# Patient Record
Sex: FEMALE | Race: WHITE | NOT HISPANIC OR LATINO | URBAN - METROPOLITAN AREA
[De-identification: names, ages, dates, MRNs, and addresses within clinical notes are randomized per-mention and may not be internally consistent; named-entity substitution may affect disease eponyms.]

---

## 2019-01-17 ENCOUNTER — OFFICE VISIT CONVERTED (OUTPATIENT)
Dept: FAMILY MEDICINE CLINIC | Facility: CLINIC | Age: 29
End: 2019-01-17
Attending: NURSE PRACTITIONER

## 2019-01-30 ENCOUNTER — HOSPITAL ENCOUNTER (OUTPATIENT)
Dept: FAMILY MEDICINE CLINIC | Facility: CLINIC | Age: 29
Discharge: HOME | End: 2019-01-30
Attending: NURSE PRACTITIONER

## 2019-01-30 ENCOUNTER — OFFICE VISIT CONVERTED (OUTPATIENT)
Dept: FAMILY MEDICINE CLINIC | Facility: CLINIC | Age: 29
End: 2019-01-30
Attending: NURSE PRACTITIONER

## 2019-02-01 LAB — BACTERIA SPEC AEROBE CULT: NORMAL

## 2019-02-06 ENCOUNTER — OFFICE VISIT CONVERTED (OUTPATIENT)
Dept: FAMILY MEDICINE CLINIC | Facility: CLINIC | Age: 29
End: 2019-02-06
Attending: NURSE PRACTITIONER

## 2019-02-06 ENCOUNTER — HOSPITAL ENCOUNTER (OUTPATIENT)
Dept: FAMILY MEDICINE CLINIC | Facility: CLINIC | Age: 29
Discharge: HOME | End: 2019-02-06
Attending: NURSE PRACTITIONER

## 2019-02-06 LAB
ALBUMIN SERPL-MCNC: 4.1 G/DL (ref 3.5–5)
ALBUMIN/GLOB SERPL: 1.2 {RATIO} (ref 1.4–2.6)
ALP SERPL-CCNC: 83 U/L (ref 42–98)
ALT SERPL-CCNC: 17 U/L (ref 10–40)
ANION GAP SERPL CALC-SCNC: 17 MMOL/L (ref 8–19)
AST SERPL-CCNC: 17 U/L (ref 15–50)
BASOPHILS # BLD AUTO: 0 10*3/UL (ref 0–0.2)
BASOPHILS NFR BLD AUTO: 0 % (ref 0–3)
BILIRUB SERPL-MCNC: <0.15 MG/DL (ref 0.2–1.3)
BUN SERPL-MCNC: 14 MG/DL (ref 5–25)
BUN/CREAT SERPL: 21 {RATIO} (ref 6–20)
CALCIUM SERPL-MCNC: 9.1 MG/DL (ref 8.7–10.4)
CHLORIDE SERPL-SCNC: 105 MMOL/L (ref 99–111)
CONV CO2: 26 MMOL/L (ref 22–32)
CONV TOTAL PROTEIN: 7.6 G/DL (ref 6.3–8.2)
CREAT UR-MCNC: 0.67 MG/DL (ref 0.5–0.9)
EOSINOPHIL # BLD AUTO: 0.22 10*3/UL (ref 0–0.7)
EOSINOPHIL # BLD AUTO: 4.19 % (ref 0–7)
ERYTHROCYTE [DISTWIDTH] IN BLOOD BY AUTOMATED COUNT: 12.3 % (ref 11.5–14.5)
GFR SERPLBLD BASED ON 1.73 SQ M-ARVRAT: >60 ML/MIN/{1.73_M2}
GLOBULIN UR ELPH-MCNC: 3.5 G/DL (ref 2–3.5)
GLUCOSE SERPL-MCNC: 73 MG/DL (ref 65–99)
HBA1C MFR BLD: 11.7 G/DL (ref 12–16)
HCT VFR BLD AUTO: 34.7 % (ref 37–47)
LYMPHOCYTES # BLD AUTO: 2.34 10*3/UL (ref 1–5)
MCH RBC QN AUTO: 29.2 PG (ref 27–31)
MCHC RBC AUTO-ENTMCNC: 33.6 G/DL (ref 33–37)
MCV RBC AUTO: 86.8 FL (ref 81–99)
MONOCYTES # BLD AUTO: 0.44 10*3/UL (ref 0.2–1.2)
MONOCYTES NFR BLD AUTO: 8.45 % (ref 3–10)
NEUTROPHILS # BLD AUTO: 2.2 10*3/UL (ref 2–8)
NEUTROPHILS NFR BLD AUTO: 42.4 % (ref 30–85)
NRBC BLD AUTO-RTO: 0 % (ref 0–0.01)
OSMOLALITY SERPL CALC.SUM OF ELEC: 297 MOSM/KG (ref 273–304)
PLATELET # BLD AUTO: 235 10*3/UL (ref 130–400)
PMV BLD AUTO: 7.5 FL (ref 7.4–10.4)
POTASSIUM SERPL-SCNC: 4 MMOL/L (ref 3.5–5.3)
RBC # BLD AUTO: 4 10*6/UL (ref 4.2–5.4)
SODIUM SERPL-SCNC: 144 MMOL/L (ref 135–147)
T4 FREE SERPL-MCNC: 1.3 NG/DL (ref 0.9–1.8)
TSH SERPL-ACNC: 2 M[IU]/L (ref 0.27–4.2)
VARIANT LYMPHS NFR BLD MANUAL: 45 % (ref 20–45)
WBC # BLD AUTO: 5.2 10*3/UL (ref 4.8–10.8)

## 2019-02-07 LAB
CONV ANTI MICROSOMAL AB: 18 IU/ML (ref 0–34)
THYROGLOBULIN ANTIBODY: <1 IU/ML (ref 0–0.9)

## 2019-02-08 ENCOUNTER — HOSPITAL ENCOUNTER (OUTPATIENT)
Dept: OTHER | Facility: HOSPITAL | Age: 29
Discharge: HOME | End: 2019-02-08
Attending: NURSE PRACTITIONER

## 2019-03-19 ENCOUNTER — CONVERSION ENCOUNTER (OUTPATIENT)
Dept: FAMILY MEDICINE CLINIC | Facility: CLINIC | Age: 29
End: 2019-03-19

## 2019-03-19 ENCOUNTER — OFFICE VISIT CONVERTED (OUTPATIENT)
Dept: FAMILY MEDICINE CLINIC | Facility: CLINIC | Age: 29
End: 2019-03-19
Attending: NURSE PRACTITIONER

## 2019-04-16 ENCOUNTER — HOSPITAL ENCOUNTER (OUTPATIENT)
Dept: FAMILY MEDICINE CLINIC | Facility: CLINIC | Age: 29
Discharge: HOME | End: 2019-04-16
Attending: NURSE PRACTITIONER

## 2019-04-16 ENCOUNTER — OFFICE VISIT CONVERTED (OUTPATIENT)
Dept: FAMILY MEDICINE CLINIC | Facility: CLINIC | Age: 29
End: 2019-04-16
Attending: NURSE PRACTITIONER

## 2019-04-16 LAB
BASOPHILS # BLD AUTO: 0.05 10*3/UL (ref 0–0.2)
BASOPHILS NFR BLD AUTO: 0.5 % (ref 0–3)
CONV ABS IMM GRAN: 0.03 10*3/UL (ref 0–0.2)
CONV IMMATURE GRAN: 0.3 % (ref 0–1.8)
DEPRECATED RDW RBC AUTO: 39.6 FL (ref 36.4–46.3)
EOSINOPHIL # BLD AUTO: 0.26 10*3/UL (ref 0–0.7)
EOSINOPHIL # BLD AUTO: 2.7 % (ref 0–7)
ERYTHROCYTE [DISTWIDTH] IN BLOOD BY AUTOMATED COUNT: 12.4 % (ref 11.7–14.4)
FERRITIN SERPL-MCNC: 23 NG/ML (ref 10–200)
FOLATE SERPL-MCNC: 15.4 NG/ML (ref 4.8–20)
HBA1C MFR BLD: 12.4 G/DL (ref 12–16)
HCT VFR BLD AUTO: 38.8 % (ref 37–47)
IRON SATN MFR SERPL: 12 % (ref 20–55)
IRON SERPL-MCNC: 44 UG/DL (ref 60–170)
LYMPHOCYTES # BLD AUTO: 2.59 10*3/UL (ref 1–5)
MCH RBC QN AUTO: 28.1 PG (ref 27–31)
MCHC RBC AUTO-ENTMCNC: 32 G/DL (ref 33–37)
MCV RBC AUTO: 88 FL (ref 81–99)
MONOCYTES # BLD AUTO: 0.46 10*3/UL (ref 0.2–1.2)
MONOCYTES NFR BLD AUTO: 4.8 % (ref 3–10)
NEUTROPHILS # BLD AUTO: 6.12 10*3/UL (ref 2–8)
NEUTROPHILS NFR BLD AUTO: 64.5 % (ref 30–85)
NRBC CBCN: 0 % (ref 0–0.7)
PLATELET # BLD AUTO: 311 10*3/UL (ref 130–400)
PMV BLD AUTO: 10.9 FL (ref 9.4–12.3)
RBC # BLD AUTO: 4.41 10*6/UL (ref 4.2–5.4)
TIBC SERPL-MCNC: 355 UG/DL (ref 245–450)
TRANSFERRIN SERPL-MCNC: 248 MG/DL (ref 250–380)
VARIANT LYMPHS NFR BLD MANUAL: 27.2 % (ref 20–45)
VIT B12 SERPL-MCNC: 471 PG/ML (ref 211–911)
WBC # BLD AUTO: 9.51 10*3/UL (ref 4.8–10.8)

## 2019-09-13 ENCOUNTER — OFFICE VISIT CONVERTED (OUTPATIENT)
Dept: FAMILY MEDICINE CLINIC | Facility: CLINIC | Age: 29
End: 2019-09-13
Attending: NURSE PRACTITIONER

## 2019-09-25 ENCOUNTER — HOSPITAL ENCOUNTER (OUTPATIENT)
Dept: OTHER | Facility: HOSPITAL | Age: 29
Discharge: HOME | End: 2019-09-25
Attending: NURSE PRACTITIONER

## 2019-10-27 ENCOUNTER — HOSPITAL ENCOUNTER (OUTPATIENT)
Dept: URGENT CARE | Facility: CLINIC | Age: 29
Discharge: HOME | End: 2019-10-27

## 2019-10-29 LAB — BACTERIA SPEC AEROBE CULT: NORMAL

## 2019-11-05 ENCOUNTER — OFFICE VISIT CONVERTED (OUTPATIENT)
Dept: FAMILY MEDICINE CLINIC | Facility: CLINIC | Age: 29
End: 2019-11-05
Attending: NURSE PRACTITIONER

## 2019-12-26 ENCOUNTER — OFFICE VISIT CONVERTED (OUTPATIENT)
Dept: FAMILY MEDICINE CLINIC | Facility: CLINIC | Age: 29
End: 2019-12-26
Attending: NURSE PRACTITIONER

## 2020-02-05 ENCOUNTER — HOSPITAL ENCOUNTER (OUTPATIENT)
Dept: URGENT CARE | Facility: CLINIC | Age: 30
Discharge: HOME | End: 2020-02-05

## 2020-02-07 LAB — BACTERIA SPEC AEROBE CULT: NORMAL

## 2021-05-09 VITALS
DIASTOLIC BLOOD PRESSURE: 80 MMHG | TEMPERATURE: 97.7 F | SYSTOLIC BLOOD PRESSURE: 120 MMHG | WEIGHT: 155.5 LBS | OXYGEN SATURATION: 98 % | HEIGHT: 67 IN | HEART RATE: 122 BPM | BODY MASS INDEX: 24.4 KG/M2

## 2021-05-09 VITALS
BODY MASS INDEX: 23.62 KG/M2 | SYSTOLIC BLOOD PRESSURE: 106 MMHG | HEART RATE: 107 BPM | WEIGHT: 150.5 LBS | HEIGHT: 67 IN | OXYGEN SATURATION: 97 % | DIASTOLIC BLOOD PRESSURE: 68 MMHG | TEMPERATURE: 98.2 F

## 2021-05-09 VITALS
HEART RATE: 88 BPM | SYSTOLIC BLOOD PRESSURE: 108 MMHG | TEMPERATURE: 97.4 F | BODY MASS INDEX: 23.56 KG/M2 | OXYGEN SATURATION: 99 % | HEIGHT: 67 IN | WEIGHT: 150.12 LBS | DIASTOLIC BLOOD PRESSURE: 58 MMHG

## 2021-05-09 VITALS
TEMPERATURE: 97.2 F | WEIGHT: 154.19 LBS | HEART RATE: 95 BPM | SYSTOLIC BLOOD PRESSURE: 116 MMHG | OXYGEN SATURATION: 97 % | DIASTOLIC BLOOD PRESSURE: 78 MMHG

## 2021-05-09 VITALS
WEIGHT: 152 LBS | TEMPERATURE: 97.8 F | HEART RATE: 77 BPM | DIASTOLIC BLOOD PRESSURE: 80 MMHG | SYSTOLIC BLOOD PRESSURE: 120 MMHG | OXYGEN SATURATION: 100 %

## 2021-05-09 VITALS
OXYGEN SATURATION: 98 % | HEART RATE: 85 BPM | TEMPERATURE: 98.6 F | BODY MASS INDEX: 24.33 KG/M2 | WEIGHT: 155 LBS | DIASTOLIC BLOOD PRESSURE: 74 MMHG | SYSTOLIC BLOOD PRESSURE: 116 MMHG | HEIGHT: 67 IN

## 2021-05-09 VITALS
HEART RATE: 103 BPM | DIASTOLIC BLOOD PRESSURE: 80 MMHG | BODY MASS INDEX: 24.37 KG/M2 | OXYGEN SATURATION: 97 % | WEIGHT: 155.25 LBS | HEIGHT: 67 IN | TEMPERATURE: 97.6 F | SYSTOLIC BLOOD PRESSURE: 122 MMHG

## 2021-05-09 VITALS
WEIGHT: 152 LBS | HEART RATE: 110 BPM | DIASTOLIC BLOOD PRESSURE: 84 MMHG | OXYGEN SATURATION: 97 % | BODY MASS INDEX: 23.86 KG/M2 | SYSTOLIC BLOOD PRESSURE: 138 MMHG | TEMPERATURE: 97.8 F | HEIGHT: 67 IN

## 2023-02-13 ENCOUNTER — OFFICE VISIT (OUTPATIENT)
Dept: FAMILY MEDICINE CLINIC | Facility: CLINIC | Age: 33
End: 2023-02-13
Payer: COMMERCIAL

## 2023-02-13 VITALS
SYSTOLIC BLOOD PRESSURE: 112 MMHG | HEART RATE: 88 BPM | OXYGEN SATURATION: 99 % | TEMPERATURE: 97.8 F | DIASTOLIC BLOOD PRESSURE: 72 MMHG | WEIGHT: 171 LBS | BODY MASS INDEX: 26.84 KG/M2 | HEIGHT: 67 IN

## 2023-02-13 DIAGNOSIS — E04.1 THYROID NODULE: Primary | ICD-10-CM

## 2023-02-13 DIAGNOSIS — R51.9 ACUTE NONINTRACTABLE HEADACHE, UNSPECIFIED HEADACHE TYPE: ICD-10-CM

## 2023-02-13 DIAGNOSIS — R53.83 OTHER FATIGUE: ICD-10-CM

## 2023-02-13 DIAGNOSIS — Z13.220 SCREENING CHOLESTEROL LEVEL: ICD-10-CM

## 2023-02-13 DIAGNOSIS — F41.9 ANXIETY: ICD-10-CM

## 2023-02-13 PROCEDURE — 80053 COMPREHEN METABOLIC PANEL: CPT | Performed by: NURSE PRACTITIONER

## 2023-02-13 PROCEDURE — 99204 OFFICE O/P NEW MOD 45 MIN: CPT | Performed by: NURSE PRACTITIONER

## 2023-02-13 PROCEDURE — 80061 LIPID PANEL: CPT | Performed by: NURSE PRACTITIONER

## 2023-02-13 PROCEDURE — 86800 THYROGLOBULIN ANTIBODY: CPT | Performed by: NURSE PRACTITIONER

## 2023-02-13 PROCEDURE — 82746 ASSAY OF FOLIC ACID SERUM: CPT | Performed by: NURSE PRACTITIONER

## 2023-02-13 PROCEDURE — 86376 MICROSOMAL ANTIBODY EACH: CPT | Performed by: NURSE PRACTITIONER

## 2023-02-13 PROCEDURE — 84443 ASSAY THYROID STIM HORMONE: CPT | Performed by: NURSE PRACTITIONER

## 2023-02-13 PROCEDURE — 82607 VITAMIN B-12: CPT | Performed by: NURSE PRACTITIONER

## 2023-02-13 RX ORDER — RIZATRIPTAN BENZOATE 5 MG/1
5 TABLET ORAL AS NEEDED
Qty: 12 TABLET | Refills: 0 | Status: SHIPPED | OUTPATIENT
Start: 2023-02-13

## 2023-02-13 RX ORDER — ESCITALOPRAM OXALATE 10 MG/1
10 TABLET ORAL DAILY
Qty: 30 TABLET | Refills: 1 | Status: SHIPPED | OUTPATIENT
Start: 2023-02-13 | End: 2023-03-13 | Stop reason: SDUPTHER

## 2023-02-13 NOTE — PROGRESS NOTES
Venipuncture Blood Specimen Collection  Venipuncture performed in left arm by Amy Valerio with good hemostasis. Patient tolerated the procedure well without complications.   02/13/23   Amy Valerio

## 2023-02-13 NOTE — PROGRESS NOTES
Chief Complaint  Thyroid Problem (Thyroid nodule, needs updated U/S), Headache (Having migraines, worse since having son that is 18 months), and Anxiety (Wants to start a medication for anxiety/depression)    PHQ-9 Total Score: 19    Subjective        Past Medical History:   Diagnosis Date   • Anxiety    • Benign essential hypertension    • Blood clotting disorder (HCC)    • Depression    • GERD (gastroesophageal reflux disease)    • Headache    • Seasonal allergies      Social History     Tobacco Use   • Smoking status: Former     Packs/day: 1.00     Years: 10.00     Pack years: 10.00     Types: Cigarettes   • Smokeless tobacco: Never   Substance Use Topics   • Alcohol use: Yes     Comment: Current some day    • Drug use: Never      No current outpatient medications on file prior to visit.     No current facility-administered medications on file prior to visit.      No Known Allergies   Health Maintenance Due   Topic Date Due   • COVID-19 Vaccine (1) Never done   • ANNUAL PHYSICAL  Never done   • PAP SMEAR  Never done   • INFLUENZA VACCINE  08/01/2022      Cherie Soriano presents to [facility]  History of Present Illness  Here with concerns about a thyroid nodule, needs an updated ultrasound. Pt states she was referred to Endocrinology but she has not been seen by Endo. Pt states her TSH was normal until about 1 year ago and was high; pt not on medication. Pt states she has very little motivation, difficulty losing weight, notes difficulty staying asleep.     Headaches: notes hx of headaches since a teenager and has been able to manage until her most recent pregnancy, 18 month old. Pt states she was on medication, but after delivery they improved. Recently getting headaches with dizziness, nauseated, feels out of body, sound sensitivity. Notes near syncope at times so will not drive with headache. She will take Excedrin migraine. Pt notes having migraines about 2-3 times monthly.  "    Anxiety: She had an intake appointment with therapist last week. She has been on medication in the past without relief of symptoms including Prozac, Zoloft only relieved nightmares, Celexa and felt very tired. Notes feeling overwhelmed and notes lots of noise. Will worry about irrational and unlikely situations. Pt notes she have thoughts without plan and         Objective   Vital Signs:   /72 (BP Location: Left arm)   Pulse 88   Temp 97.8 °F (36.6 °C)   Ht 170.2 cm (67\")   Wt 77.6 kg (171 lb)   SpO2 99%   BMI 26.78 kg/m²     Review of Systems   Physical Exam  Vitals reviewed.   Constitutional:       General: She is not in acute distress.     Appearance: Normal appearance. She is well-developed.   HENT:      Head: Normocephalic and atraumatic.      Right Ear: External ear normal.      Left Ear: External ear normal.   Eyes:      Conjunctiva/sclera: Conjunctivae normal.      Pupils: Pupils are equal, round, and reactive to light.   Cardiovascular:      Rate and Rhythm: Normal rate and regular rhythm.      Heart sounds: Normal heart sounds.   Pulmonary:      Effort: Pulmonary effort is normal.      Breath sounds: Normal breath sounds.   Musculoskeletal:      Cervical back: Neck supple. No tenderness.   Skin:     General: Skin is warm and dry.   Neurological:      Mental Status: She is alert and oriented to person, place, and time.   Psychiatric:         Mood and Affect: Mood and affect normal.         Behavior: Behavior normal.         Thought Content: Thought content normal.         Judgment: Judgment normal.        Result Review :                 Assessment and Plan    Diagnoses and all orders for this visit:    1. Thyroid nodule (Primary)  -     US Thyroid; Future  -     Ambulatory Referral to Endocrinology  -     TSH Rfx On Abnormal To Free T4  -     Thyroid Antibodies    2. Anxiety  -     escitalopram (Lexapro) 10 MG tablet; Take 1 tablet by mouth Daily. Take 1/2 tablet daily x 1 week then increase " to 1 tablet daily  Dispense: 30 tablet; Refill: 1    3. Acute nonintractable headache, unspecified headache type  -     rizatriptan (MAXALT) 5 MG tablet; Take 1 tablet by mouth As Needed for Migraine (at onset of headache) for up to 1 dose. May repeat in 2 hours if needed  Dispense: 12 tablet; Refill: 0    4. Screening cholesterol level  -     Lipid Panel    5. Other fatigue  -     Comprehensive Metabolic Panel  -     Vitamin B12 & Folate    Patient to follow-up in 2 to 4 weeks to see if medication is helping with symptoms.  Discussed appropriate use of Maxalt.  Patient states understanding.  Patient verbally agrees to no self-harm.  Patient to continue to see therapist.    Follow Up   Return in about 4 weeks (around 3/13/2023) for Recheck.  Patient was given instructions and counseling regarding her condition or for health maintenance advice. Please see specific information pulled into the AVS if appropriate.

## 2023-02-14 LAB
ALBUMIN SERPL-MCNC: 4.7 G/DL (ref 3.5–5.2)
ALBUMIN/GLOB SERPL: 1.5 G/DL
ALP SERPL-CCNC: 77 U/L (ref 39–117)
ALT SERPL W P-5'-P-CCNC: 12 U/L (ref 1–33)
ANION GAP SERPL CALCULATED.3IONS-SCNC: 12.3 MMOL/L (ref 5–15)
AST SERPL-CCNC: 17 U/L (ref 1–32)
BILIRUB SERPL-MCNC: 0.4 MG/DL (ref 0–1.2)
BUN SERPL-MCNC: 9 MG/DL (ref 6–20)
BUN/CREAT SERPL: 14.1 (ref 7–25)
CALCIUM SPEC-SCNC: 9.3 MG/DL (ref 8.6–10.5)
CHLORIDE SERPL-SCNC: 105 MMOL/L (ref 98–107)
CHOLEST SERPL-MCNC: 148 MG/DL (ref 0–200)
CO2 SERPL-SCNC: 22.7 MMOL/L (ref 22–29)
CREAT SERPL-MCNC: 0.64 MG/DL (ref 0.57–1)
EGFRCR SERPLBLD CKD-EPI 2021: 121.3 ML/MIN/1.73
FOLATE SERPL-MCNC: 12.2 NG/ML (ref 4.78–24.2)
GLOBULIN UR ELPH-MCNC: 3.2 GM/DL
GLUCOSE SERPL-MCNC: 77 MG/DL (ref 65–99)
HDLC SERPL-MCNC: 48 MG/DL (ref 40–60)
LDLC SERPL CALC-MCNC: 89 MG/DL (ref 0–100)
LDLC/HDLC SERPL: 1.87 {RATIO}
POTASSIUM SERPL-SCNC: 4 MMOL/L (ref 3.5–5.2)
PROT SERPL-MCNC: 7.9 G/DL (ref 6–8.5)
SODIUM SERPL-SCNC: 140 MMOL/L (ref 136–145)
TRIGL SERPL-MCNC: 52 MG/DL (ref 0–150)
TSH SERPL DL<=0.05 MIU/L-ACNC: 1.31 UIU/ML (ref 0.27–4.2)
VIT B12 BLD-MCNC: 358 PG/ML (ref 211–946)
VLDLC SERPL-MCNC: 11 MG/DL (ref 5–40)

## 2023-02-15 LAB
THYROGLOB AB SERPL-ACNC: <1 IU/ML (ref 0–0.9)
THYROPEROXIDASE AB SERPL-ACNC: 66 IU/ML (ref 0–34)

## 2023-03-13 ENCOUNTER — OFFICE VISIT (OUTPATIENT)
Dept: FAMILY MEDICINE CLINIC | Facility: CLINIC | Age: 33
End: 2023-03-13
Payer: COMMERCIAL

## 2023-03-13 ENCOUNTER — HOSPITAL ENCOUNTER (OUTPATIENT)
Dept: ULTRASOUND IMAGING | Facility: HOSPITAL | Age: 33
Discharge: HOME | End: 2023-03-13
Admitting: NURSE PRACTITIONER
Payer: COMMERCIAL

## 2023-03-13 VITALS
BODY MASS INDEX: 27.1 KG/M2 | DIASTOLIC BLOOD PRESSURE: 70 MMHG | HEART RATE: 96 BPM | OXYGEN SATURATION: 99 % | WEIGHT: 173 LBS | TEMPERATURE: 97.3 F | SYSTOLIC BLOOD PRESSURE: 110 MMHG

## 2023-03-13 DIAGNOSIS — E04.1 THYROID NODULE: ICD-10-CM

## 2023-03-13 DIAGNOSIS — F41.9 ANXIETY: ICD-10-CM

## 2023-03-13 DIAGNOSIS — E04.1 THYROID NODULE: Primary | ICD-10-CM

## 2023-03-13 PROBLEM — Z86.69 HISTORY OF MIGRAINE HEADACHES: Status: ACTIVE | Noted: 2021-03-09

## 2023-03-13 PROBLEM — O09.90 HRP (HIGH RISK PREGNANCY): Status: RESOLVED | Noted: 2018-07-26 | Resolved: 2023-03-13

## 2023-03-13 PROBLEM — J30.2 SEASONAL ALLERGIC RHINITIS: Status: ACTIVE | Noted: 2023-03-13

## 2023-03-13 PROBLEM — Z28.39 RUBELLA NON-IMMUNE STATUS, ANTEPARTUM: Status: RESOLVED | Noted: 2020-12-04 | Resolved: 2023-03-13

## 2023-03-13 PROBLEM — O09.899 RUBELLA NON-IMMUNE STATUS, ANTEPARTUM: Status: ACTIVE | Noted: 2020-12-04

## 2023-03-13 PROBLEM — I10 BENIGN ESSENTIAL HYPERTENSION: Status: ACTIVE | Noted: 2023-03-13

## 2023-03-13 PROBLEM — Z28.39 RUBELLA NON-IMMUNE STATUS, ANTEPARTUM: Status: ACTIVE | Noted: 2020-12-04

## 2023-03-13 PROBLEM — O09.899 RUBELLA NON-IMMUNE STATUS, ANTEPARTUM: Status: RESOLVED | Noted: 2020-12-04 | Resolved: 2023-03-13

## 2023-03-13 PROBLEM — F32.A DEPRESSION: Status: ACTIVE | Noted: 2023-03-13

## 2023-03-13 PROBLEM — O09.90 HRP (HIGH RISK PREGNANCY): Status: ACTIVE | Noted: 2018-07-26

## 2023-03-13 PROCEDURE — 1159F MED LIST DOCD IN RCRD: CPT | Performed by: NURSE PRACTITIONER

## 2023-03-13 PROCEDURE — 3074F SYST BP LT 130 MM HG: CPT | Performed by: NURSE PRACTITIONER

## 2023-03-13 PROCEDURE — 76536 US EXAM OF HEAD AND NECK: CPT

## 2023-03-13 PROCEDURE — 1160F RVW MEDS BY RX/DR IN RCRD: CPT | Performed by: NURSE PRACTITIONER

## 2023-03-13 PROCEDURE — 99213 OFFICE O/P EST LOW 20 MIN: CPT | Performed by: NURSE PRACTITIONER

## 2023-03-13 PROCEDURE — 3078F DIAST BP <80 MM HG: CPT | Performed by: NURSE PRACTITIONER

## 2023-03-13 RX ORDER — ESCITALOPRAM OXALATE 10 MG/1
15 TABLET ORAL DAILY
Qty: 90 TABLET | Refills: 0 | Status: SHIPPED | OUTPATIENT
Start: 2023-03-13

## 2023-03-13 NOTE — PROGRESS NOTES
Chief Complaint  Anxiety    Subjective        Past Medical History:   Diagnosis Date   • Anxiety    • Benign essential hypertension    • Blood clotting disorder (HCC)    • Depression    • GERD (gastroesophageal reflux disease)    • Headache    • Seasonal allergies      Social History     Tobacco Use   • Smoking status: Former     Packs/day: 1.00     Years: 10.00     Pack years: 10.00     Types: Cigarettes   • Smokeless tobacco: Never   Substance Use Topics   • Alcohol use: Yes     Comment: Current some day    • Drug use: Never      Current Outpatient Medications on File Prior to Visit   Medication Sig   • rizatriptan (MAXALT) 5 MG tablet Take 1 tablet by mouth As Needed for Migraine (at onset of headache) for up to 1 dose. May repeat in 2 hours if needed   • [DISCONTINUED] escitalopram (Lexapro) 10 MG tablet Take 1 tablet by mouth Daily. Take 1/2 tablet daily x 1 week then increase to 1 tablet daily     No current facility-administered medications on file prior to visit.      No Known Allergies   Health Maintenance Due   Topic Date Due   • COVID-19 Vaccine (1) Never done   • ANNUAL PHYSICAL  Never done   • PAP SMEAR  Never done   • INFLUENZA VACCINE  08/01/2022      Cherie Soriano presents to [facility]  History of Present Illness  Here to follow up on anxiety. Pt states she feels calmer and more patient with children and talk to them on their level. Pt noted headaches the first week but was also starting to wear glasses. Depression symptoms have somewhat improved. Pts significant other has noted an improved mood in patient. Enjoys reading and feels spaced out at times, no change with medication.     Patient states she had thyroid ultrasound done today.  Would like to follow-up with ENT that her family sees.      Objective   Vital Signs:   /70   Pulse 96   Temp 97.3 °F (36.3 °C)   Wt 78.5 kg (173 lb)   SpO2 99%   BMI 27.10 kg/m²     Review of Systems   Respiratory:  Negative for shortness of breath.    Cardiovascular: Negative for chest pain and palpitations.      Physical Exam  Vitals reviewed.   Constitutional:       General: She is not in acute distress.     Appearance: Normal appearance. She is well-developed.   HENT:      Head: Normocephalic and atraumatic.   Eyes:      Conjunctiva/sclera: Conjunctivae normal.      Pupils: Pupils are equal, round, and reactive to light.   Cardiovascular:      Rate and Rhythm: Normal rate and regular rhythm.      Heart sounds: Normal heart sounds.   Pulmonary:      Effort: Pulmonary effort is normal.      Breath sounds: Normal breath sounds.   Musculoskeletal:      Cervical back: Neck supple. No tenderness.   Skin:     General: Skin is warm and dry.   Neurological:      Mental Status: She is alert and oriented to person, place, and time.   Psychiatric:         Mood and Affect: Mood and affect normal.         Behavior: Behavior normal.         Thought Content: Thought content normal.         Judgment: Judgment normal.        Result Review :   The following data was reviewed by: SAUMYA Schneider on 03/13/2023:  TSH    TSH 2/13/23   TSH 1.310           Data reviewed: Radiologic studies thyroid ultrasound          Assessment and Plan    Diagnoses and all orders for this visit:    1. Thyroid nodule (Primary)  -     Ambulatory Referral to ENT (Otolaryngology)    2. Anxiety  -     escitalopram (Lexapro) 10 MG tablet; Take 1.5 tablets by mouth Daily.  Dispense: 90 tablet; Refill: 0    Increased dose of Lexapro to 15 mg daily.  Patient to follow-up in 3 months.    Follow Up   Return in about 3 months (around 6/13/2023) for Refill, Recheck.  Patient was given instructions and counseling regarding her condition or for health maintenance advice. Please see specific information pulled into the AVS if appropriate.

## 2023-04-06 ENCOUNTER — TELEPHONE (OUTPATIENT)
Dept: FAMILY MEDICINE CLINIC | Facility: CLINIC | Age: 33
End: 2023-04-06
Payer: COMMERCIAL

## 2023-04-10 DIAGNOSIS — F41.9 ANXIETY: ICD-10-CM

## 2023-04-11 RX ORDER — ESCITALOPRAM OXALATE 10 MG/1
TABLET ORAL
Qty: 30 TABLET | OUTPATIENT
Start: 2023-04-11

## 2023-06-01 ENCOUNTER — OFFICE VISIT (OUTPATIENT)
Dept: FAMILY MEDICINE CLINIC | Facility: CLINIC | Age: 33
End: 2023-06-01

## 2023-06-01 VITALS
DIASTOLIC BLOOD PRESSURE: 78 MMHG | BODY MASS INDEX: 28.09 KG/M2 | HEIGHT: 67 IN | SYSTOLIC BLOOD PRESSURE: 116 MMHG | WEIGHT: 179 LBS | HEART RATE: 89 BPM | TEMPERATURE: 97.8 F | OXYGEN SATURATION: 99 %

## 2023-06-01 DIAGNOSIS — R11.0 NAUSEA: ICD-10-CM

## 2023-06-01 DIAGNOSIS — R51.9 ACUTE NONINTRACTABLE HEADACHE, UNSPECIFIED HEADACHE TYPE: Primary | ICD-10-CM

## 2023-06-01 DIAGNOSIS — F41.9 ANXIETY: ICD-10-CM

## 2023-06-01 PROCEDURE — 99214 OFFICE O/P EST MOD 30 MIN: CPT | Performed by: NURSE PRACTITIONER

## 2023-06-01 PROCEDURE — 1160F RVW MEDS BY RX/DR IN RCRD: CPT | Performed by: NURSE PRACTITIONER

## 2023-06-01 PROCEDURE — 3074F SYST BP LT 130 MM HG: CPT | Performed by: NURSE PRACTITIONER

## 2023-06-01 PROCEDURE — 3078F DIAST BP <80 MM HG: CPT | Performed by: NURSE PRACTITIONER

## 2023-06-01 PROCEDURE — 1159F MED LIST DOCD IN RCRD: CPT | Performed by: NURSE PRACTITIONER

## 2023-06-01 RX ORDER — RIZATRIPTAN BENZOATE 10 MG/1
10 TABLET ORAL AS NEEDED
Qty: 20 TABLET | Refills: 0 | Status: SHIPPED | OUTPATIENT
Start: 2023-06-01

## 2023-06-01 RX ORDER — KETOROLAC TROMETHAMINE 30 MG/ML
30 INJECTION, SOLUTION INTRAMUSCULAR; INTRAVENOUS ONCE
Status: COMPLETED | OUTPATIENT
Start: 2023-06-01 | End: 2023-06-01

## 2023-06-01 RX ORDER — FLUTICASONE PROPIONATE 50 MCG
SPRAY, SUSPENSION (ML) NASAL
COMMUNITY
Start: 2023-05-25

## 2023-06-01 RX ORDER — TOPIRAMATE 25 MG/1
25 TABLET ORAL 2 TIMES DAILY
Qty: 60 TABLET | Refills: 0 | Status: SHIPPED | OUTPATIENT
Start: 2023-06-01

## 2023-06-01 RX ORDER — PAROXETINE 10 MG/1
10 TABLET, FILM COATED ORAL EVERY MORNING
Qty: 30 TABLET | Refills: 0 | Status: SHIPPED | OUTPATIENT
Start: 2023-06-01

## 2023-06-01 RX ORDER — NAPROXEN 500 MG/1
TABLET ORAL
COMMUNITY
Start: 2023-03-29

## 2023-06-01 RX ORDER — ONDANSETRON 4 MG/1
4 TABLET, ORALLY DISINTEGRATING ORAL EVERY 8 HOURS PRN
Qty: 12 TABLET | Refills: 1 | Status: SHIPPED | OUTPATIENT
Start: 2023-06-01

## 2023-06-01 RX ADMIN — KETOROLAC TROMETHAMINE 30 MG: 30 INJECTION, SOLUTION INTRAMUSCULAR; INTRAVENOUS at 10:12

## 2023-06-01 RX ADMIN — KETOROLAC TROMETHAMINE 30 MG: 30 INJECTION, SOLUTION INTRAMUSCULAR; INTRAVENOUS at 10:13

## 2023-06-01 NOTE — PROGRESS NOTES
"Chief Complaint  Anxiety (Wants to change anxiety medication) and Headache (Started Monday)    Subjective        Past Medical History:   Diagnosis Date   • Anxiety    • Benign essential hypertension    • Blood clotting disorder    • Depression    • GERD (gastroesophageal reflux disease)    • Headache    • History of medical problems     Hashimotos   • Rubella non-immune status, antepartum 12/04/2020   • Seasonal allergies    • Shoulder dystocia during labor and delivery 07/23/2021     Social History     Tobacco Use   • Smoking status: Former     Packs/day: 1.00     Years: 10.00     Pack years: 10.00     Types: Cigarettes     Passive exposure: Past   • Smokeless tobacco: Never   Vaping Use   • Vaping Use: Former   Substance Use Topics   • Alcohol use: Yes     Comment: Current some day    • Drug use: Never      Current Outpatient Medications on File Prior to Visit   Medication Sig   • fluticasone (FLONASE) 50 MCG/ACT nasal spray    • naproxen (NAPROSYN) 500 MG tablet    • [DISCONTINUED] escitalopram (Lexapro) 10 MG tablet Take 1.5 tablets by mouth Daily.   • [DISCONTINUED] rizatriptan (MAXALT) 5 MG tablet Take 1 tablet by mouth As Needed for Migraine (at onset of headache) for up to 1 dose. May repeat in 2 hours if needed     No current facility-administered medications on file prior to visit.      No Known Allergies   Health Maintenance Due   Topic Date Due   • COVID-19 Vaccine (1) Never done   • ANNUAL PHYSICAL  Never done   • PAP SMEAR  Never done      Cherie Soriano presents to [facility]  History of Present Illness  Here today with her 5 children for a migraines x 4 days. Yesterday and the day prior headache was so bad she considered going to the ER. She will usually have to sleep in a dark room. Notes sensitvity to light, sound, smells. Tried caffeine, chocolate, Ubelvrey, Naproxen.    Notes the lexapro is causing her to be \"lazy\", doesn't want to clean, doesn't care if kids are " "fighting. Notes nearly impossible to have orgasm on medication. Hx of Prozac, Zoloft, and Celexa. Denies SI.        Objective   Vital Signs:   /78 (BP Location: Left arm)   Pulse 89   Temp 97.8 °F (36.6 °C)   Ht 170.2 cm (67\")   Wt 81.2 kg (179 lb)   SpO2 99%   BMI 28.04 kg/m²     Review of Systems   Physical Exam  Vitals reviewed.   Constitutional:       General: She is not in acute distress.     Appearance: Normal appearance. She is well-developed.   HENT:      Head: Normocephalic and atraumatic.   Eyes:      Conjunctiva/sclera: Conjunctivae normal.      Pupils: Pupils are equal, round, and reactive to light.   Cardiovascular:      Rate and Rhythm: Normal rate and regular rhythm.      Heart sounds: Normal heart sounds.   Pulmonary:      Effort: Pulmonary effort is normal.      Breath sounds: Normal breath sounds.   Skin:     General: Skin is warm and dry.   Neurological:      Mental Status: She is alert and oriented to person, place, and time.   Psychiatric:         Mood and Affect: Mood and affect normal.         Behavior: Behavior normal.         Thought Content: Thought content normal.         Judgment: Judgment normal.        Result Review :                 Assessment and Plan    Diagnoses and all orders for this visit:    1. Acute nonintractable headache, unspecified headache type (Primary)  -     rizatriptan (MAXALT) 10 MG tablet; Take 1 tablet by mouth As Needed for Migraine (at onset of headache) for up to 1 dose.  Dispense: 20 tablet; Refill: 0  -     ketorolac (TORADOL) injection 30 mg  -     ketorolac (TORADOL) injection 30 mg  -     topiramate (TOPAMAX) 25 MG tablet; Take 1 tablet by mouth 2 (Two) Times a Day.  Dispense: 60 tablet; Refill: 0  -     ondansetron ODT (ZOFRAN-ODT) 4 MG disintegrating tablet; Place 1 tablet on the tongue Every 8 (Eight) Hours As Needed for Nausea.  Dispense: 12 tablet; Refill: 1    2. Anxiety  -     PARoxetine (Paxil) 10 MG tablet; Take 1 tablet by mouth Every " Morning.  Dispense: 30 tablet; Refill: 0    3. Nausea  -     ondansetron ODT (ZOFRAN-ODT) 4 MG disintegrating tablet; Place 1 tablet on the tongue Every 8 (Eight) Hours As Needed for Nausea.  Dispense: 12 tablet; Refill: 1    Start Topamax 25mg nightly x 1 week then increase to twice daily. Start Paxil, do not start same day as Topamax.     Follow Up   Return in about 4 weeks (around 6/29/2023) for Recheck, Refill.  Patient was given instructions and counseling regarding her condition or for health maintenance advice. Please see specific information pulled into the AVS if appropriate.

## 2023-08-02 DIAGNOSIS — R51.9 ACUTE NONINTRACTABLE HEADACHE, UNSPECIFIED HEADACHE TYPE: ICD-10-CM

## 2023-08-02 RX ORDER — ERENUMAB-AOOE 70 MG/ML
INJECTION SUBCUTANEOUS
Qty: 1 ML | Refills: 0 | Status: SHIPPED | OUTPATIENT
Start: 2023-08-02

## 2023-08-10 ENCOUNTER — CLINICAL SUPPORT (OUTPATIENT)
Dept: FAMILY MEDICINE CLINIC | Facility: CLINIC | Age: 33
End: 2023-08-10
Payer: COMMERCIAL

## 2023-08-10 DIAGNOSIS — F41.9 ANXIETY: Primary | ICD-10-CM

## 2023-08-18 ENCOUNTER — PATIENT MESSAGE (OUTPATIENT)
Dept: FAMILY MEDICINE CLINIC | Facility: CLINIC | Age: 33
End: 2023-08-18
Payer: COMMERCIAL

## 2023-08-19 NOTE — TELEPHONE ENCOUNTER
From: Cherie Soriano  To: Isela Doe  Sent: 8/18/2023 9:53 AM EDT  Subject: What's next?    I did not understand the results of my genesite test. What do we do next? Do I need to schedule an appointment or do you call something in?

## 2023-08-23 ENCOUNTER — OFFICE VISIT (OUTPATIENT)
Dept: FAMILY MEDICINE CLINIC | Facility: CLINIC | Age: 33
End: 2023-08-23
Payer: COMMERCIAL

## 2023-08-23 VITALS
HEART RATE: 87 BPM | HEIGHT: 67 IN | DIASTOLIC BLOOD PRESSURE: 72 MMHG | TEMPERATURE: 97.8 F | OXYGEN SATURATION: 99 % | BODY MASS INDEX: 27.94 KG/M2 | SYSTOLIC BLOOD PRESSURE: 120 MMHG | WEIGHT: 178 LBS

## 2023-08-23 DIAGNOSIS — F32.A DEPRESSION, UNSPECIFIED DEPRESSION TYPE: ICD-10-CM

## 2023-08-23 DIAGNOSIS — F41.9 ANXIETY: Primary | ICD-10-CM

## 2023-08-23 DIAGNOSIS — R51.9 ACUTE NONINTRACTABLE HEADACHE, UNSPECIFIED HEADACHE TYPE: ICD-10-CM

## 2023-08-23 PROCEDURE — 1159F MED LIST DOCD IN RCRD: CPT | Performed by: NURSE PRACTITIONER

## 2023-08-23 PROCEDURE — 1160F RVW MEDS BY RX/DR IN RCRD: CPT | Performed by: NURSE PRACTITIONER

## 2023-08-23 PROCEDURE — 99214 OFFICE O/P EST MOD 30 MIN: CPT | Performed by: NURSE PRACTITIONER

## 2023-08-23 PROCEDURE — 3078F DIAST BP <80 MM HG: CPT | Performed by: NURSE PRACTITIONER

## 2023-08-23 PROCEDURE — 3074F SYST BP LT 130 MM HG: CPT | Performed by: NURSE PRACTITIONER

## 2023-08-23 RX ORDER — DULOXETIN HYDROCHLORIDE 30 MG/1
30 CAPSULE, DELAYED RELEASE ORAL DAILY
Qty: 30 CAPSULE | Refills: 0 | Status: SHIPPED | OUTPATIENT
Start: 2023-08-23

## 2023-08-23 RX ORDER — AMITRIPTYLINE HYDROCHLORIDE 25 MG/1
25 TABLET, FILM COATED ORAL NIGHTLY
Qty: 30 TABLET | Refills: 0 | Status: SHIPPED | OUTPATIENT
Start: 2023-08-23

## 2023-08-23 NOTE — PROGRESS NOTES
Answers submitted by the patient for this visit:  Primary Reason for Visit (Submitted on 8/23/2023)  What is the primary reason for your visit?: Neurological Problem  Neurological Problem Questionnaire (Submitted on 8/23/2023)  Chief Complaint: Neurologic complaint  altered mental status: Yes  clumsiness: No  focal sensory loss: No  focal weakness: No  loss of balance: Yes  memory loss: Yes  near-syncope: No  slurred speech: No  syncope: No  visual change: Yes  weakness: No  Chronicity: recurrent  Onset: more than 1 year ago  Onset quality: gradually  Progression since onset: waxing and waning  abdominal pain: No  auditory change: No  aura: No  back pain: Yes  bladder incontinence: No  bowel incontinence: No  chest pain: No  confusion: Yes  diaphoresis: No  dizziness: Yes  fatigue: Yes  fever: No  headaches: Yes  light-headedness: Yes  nausea: Yes  neck pain: Yes  palpitations: Yes  shortness of breath: No  vertigo: Yes  vomiting: No  Treatments tried: acetaminophen, bed rest, drinking, eating, medication, position change, sleep  Improvement on treatment: mild  Chief Complaint  Abnormal Lab (Discuss lab and changing medication.)    Subjective        Past Medical History:   Diagnosis Date    Anxiety     Benign essential hypertension     Blood clotting disorder     Depression     GERD (gastroesophageal reflux disease)     Headache     History of medical problems     Hashimotos    Rubella non-immune status, antepartum 12/04/2020    Seasonal allergies     Shoulder dystocia during labor and delivery 07/23/2021     Social History     Tobacco Use    Smoking status: Former     Packs/day: 1.00     Years: 10.00     Pack years: 10.00     Types: Cigarettes     Quit date: 09/2019     Years since quitting: 3.9     Passive exposure: Past    Smokeless tobacco: Never   Vaping Use    Vaping Use: Former   Substance Use Topics    Alcohol use: Yes     Comment: Current some day     Drug use: Never      Current Outpatient Medications on  File Prior to Visit   Medication Sig    Aimovig 70 MG/ML auto-injector ADMINISTER 1 ML UNDER THE SKIN 1 TIME FOR 1 DOSE AS DIRECTED    ondansetron ODT (ZOFRAN-ODT) 4 MG disintegrating tablet Place 1 tablet on the tongue Every 8 (Eight) Hours As Needed for Nausea.    rizatriptan (MAXALT) 10 MG tablet Take 1 tablet by mouth As Needed for Migraine (at onset of headache) for up to 1 dose.    [DISCONTINUED] fluticasone (FLONASE) 50 MCG/ACT nasal spray     [DISCONTINUED] PARoxetine (PAXIL) 20 MG tablet Take 1 tablet by mouth Every Morning.    [DISCONTINUED] naproxen (NAPROSYN) 500 MG tablet      No current facility-administered medications on file prior to visit.      No Known Allergies   Health Maintenance Due   Topic Date Due    COVID-19 Vaccine (1) Never done    ANNUAL PHYSICAL  Never done    PAP SMEAR  Never done      Cherie Soriano presents to [facility]  Neurologic Problem  The patient's primary symptoms include an altered mental status, a loss of balance, memory loss and a visual change. The patient's pertinent negatives include no clumsiness, focal sensory loss, focal weakness, near-syncope, slurred speech, syncope or weakness. This is a recurrent problem. The current episode started more than 1 year ago. The neurological problem developed gradually. The problem has been waxing and waning since onset. Associated symptoms include back pain, confusion, dizziness, fatigue, headaches, light-headedness, nausea, neck pain, palpitations and vertigo. Pertinent negatives include no abdominal pain, auditory change, aura, bladder incontinence, bowel incontinence, chest pain, diaphoresis, fever, shortness of breath or vomiting. Past treatments include acetaminophen, bed rest, drinking, eating, medication, position change and sleep. The treatment provided mild relief.   Abnormal Lab  Associated symptoms include fatigue, headaches, nausea, neck pain, vertigo and a visual change. Pertinent  "negatives include no abdominal pain, chest pain, diaphoresis, fever, vomiting or weakness.   Here to follow up on Genesight test. Pt notes she has tried Prozac, Paxil, Zoloft, Lexapro without improvement in symptoms. Pt would like to change medication today for anxiety, depression symptoms, headache, and always feeling angry.     Notes daily headaches even with Aimovig. Previously on Topamax but had mood swings and made her \"mean and wanted to fight\".  Objective   Vital Signs:   /72 (BP Location: Left arm)   Pulse 87   Temp 97.8 øF (36.6 øC)   Ht 170.2 cm (67\")   Wt 80.7 kg (178 lb)   SpO2 99%   BMI 27.88 kg/mý     Review of Systems   Constitutional:  Positive for fatigue. Negative for diaphoresis and fever.   Respiratory:  Negative for shortness of breath.    Cardiovascular:  Positive for palpitations. Negative for chest pain and near-syncope.   Gastrointestinal:  Positive for nausea. Negative for abdominal pain, bowel incontinence and vomiting.   Genitourinary:  Negative for urinary incontinence.   Musculoskeletal:  Positive for back pain and neck pain.   Neurological:  Positive for dizziness, vertigo, light-headedness, loss of balance and confusion. Negative for focal weakness, syncope and weakness.   Psychiatric/Behavioral:  Positive for memory loss.     Physical Exam  Vitals reviewed.   Constitutional:       General: She is not in acute distress.     Appearance: Normal appearance. She is well-developed.   Eyes:      Conjunctiva/sclera: Conjunctivae normal.      Pupils: Pupils are equal, round, and reactive to light.   Cardiovascular:      Rate and Rhythm: Normal rate and regular rhythm.      Heart sounds: Normal heart sounds.   Pulmonary:      Effort: Pulmonary effort is normal.      Breath sounds: Normal breath sounds.   Musculoskeletal:      Cervical back: Neck supple.   Skin:     General: Skin is warm and dry.   Neurological:      Mental Status: She is alert and oriented to person, place, and " time.   Psychiatric:         Mood and Affect: Mood and affect normal.         Behavior: Behavior normal.         Thought Content: Thought content normal.         Judgment: Judgment normal.      Result Review :   The following data was reviewed by: SAUMYA Schneider on 08/23/2023:     LABORATORY - SCAN - GENESIGHT, 08/17/2023 (08/17/2023)          Assessment and Plan    Diagnoses and all orders for this visit:    1. Anxiety (Primary)  -     DULoxetine (CYMBALTA) 30 MG capsule; Take 1 capsule by mouth Daily.  Dispense: 30 capsule; Refill: 0    2. Depression, unspecified depression type  -     DULoxetine (CYMBALTA) 30 MG capsule; Take 1 capsule by mouth Daily.  Dispense: 30 capsule; Refill: 0    3. Acute nonintractable headache, unspecified headache type  -     amitriptyline (ELAVIL) 25 MG tablet; Take 1 tablet by mouth Every Night.  Dispense: 30 tablet; Refill: 0             Discussed Genesight results with pt. Will start on Cymbalta and then after 3-5 days pt may start amitriptyline at night for migraine prevention. If not improvement with amitriptyline will consider other injectable medication.     Follow Up   Return in about 4 weeks (around 9/20/2023) for Recheck.  Patient was given instructions and counseling regarding her condition or for health maintenance advice. Please see specific information pulled into the AVS if appropriate.

## 2023-08-24 DIAGNOSIS — F41.9 ANXIETY: ICD-10-CM

## 2023-08-24 RX ORDER — PAROXETINE HYDROCHLORIDE 20 MG/1
20 TABLET, FILM COATED ORAL EVERY MORNING
Qty: 30 TABLET | Refills: 1 | OUTPATIENT
Start: 2023-08-24

## 2023-09-03 DIAGNOSIS — R51.9 ACUTE NONINTRACTABLE HEADACHE, UNSPECIFIED HEADACHE TYPE: ICD-10-CM

## 2023-09-05 RX ORDER — ERENUMAB-AOOE 70 MG/ML
INJECTION SUBCUTANEOUS
Qty: 1 ML | Refills: 0 | Status: SHIPPED | OUTPATIENT
Start: 2023-09-05

## 2023-09-22 ENCOUNTER — OFFICE VISIT (OUTPATIENT)
Dept: FAMILY MEDICINE CLINIC | Facility: CLINIC | Age: 33
End: 2023-09-22
Payer: COMMERCIAL

## 2023-09-22 VITALS
TEMPERATURE: 97.8 F | WEIGHT: 180 LBS | SYSTOLIC BLOOD PRESSURE: 112 MMHG | BODY MASS INDEX: 28.19 KG/M2 | OXYGEN SATURATION: 98 % | HEART RATE: 98 BPM | DIASTOLIC BLOOD PRESSURE: 76 MMHG

## 2023-09-22 DIAGNOSIS — F41.9 ANXIETY: ICD-10-CM

## 2023-09-22 DIAGNOSIS — R51.9 ACUTE NONINTRACTABLE HEADACHE, UNSPECIFIED HEADACHE TYPE: ICD-10-CM

## 2023-09-22 DIAGNOSIS — F32.A DEPRESSION, UNSPECIFIED DEPRESSION TYPE: ICD-10-CM

## 2023-09-22 PROCEDURE — 1159F MED LIST DOCD IN RCRD: CPT | Performed by: NURSE PRACTITIONER

## 2023-09-22 PROCEDURE — 1160F RVW MEDS BY RX/DR IN RCRD: CPT | Performed by: NURSE PRACTITIONER

## 2023-09-22 PROCEDURE — 3074F SYST BP LT 130 MM HG: CPT | Performed by: NURSE PRACTITIONER

## 2023-09-22 PROCEDURE — 99213 OFFICE O/P EST LOW 20 MIN: CPT | Performed by: NURSE PRACTITIONER

## 2023-09-22 PROCEDURE — 3078F DIAST BP <80 MM HG: CPT | Performed by: NURSE PRACTITIONER

## 2023-09-22 RX ORDER — DULOXETIN HYDROCHLORIDE 30 MG/1
30 CAPSULE, DELAYED RELEASE ORAL 2 TIMES DAILY
Qty: 60 CAPSULE | Refills: 2 | Status: SHIPPED | OUTPATIENT
Start: 2023-09-22

## 2023-09-22 RX ORDER — AMITRIPTYLINE HYDROCHLORIDE 50 MG/1
50 TABLET, FILM COATED ORAL NIGHTLY
Qty: 30 TABLET | Refills: 2 | Status: SHIPPED | OUTPATIENT
Start: 2023-09-22

## 2023-09-22 NOTE — PROGRESS NOTES
Chief Complaint  Anxiety (Follow up meds )    Subjective        Past Medical History:   Diagnosis Date    Anxiety     Benign essential hypertension     Blood clotting disorder     Depression     GERD (gastroesophageal reflux disease)     Headache     History of medical problems     Hashimotos    Rubella non-immune status, antepartum 2020    Seasonal allergies     Shoulder dystocia during labor and delivery 2021     Social History     Tobacco Use    Smoking status: Former     Packs/day: 1.00     Years: 10.00     Pack years: 10.00     Types: Cigarettes     Quit date: 2019     Years since quittin.0     Passive exposure: Past    Smokeless tobacco: Never   Vaping Use    Vaping Use: Former   Substance Use Topics    Alcohol use: Yes     Comment: Current some day     Drug use: Never      Current Outpatient Medications on File Prior to Visit   Medication Sig    ondansetron ODT (ZOFRAN-ODT) 4 MG disintegrating tablet Place 1 tablet on the tongue Every 8 (Eight) Hours As Needed for Nausea.    rizatriptan (MAXALT) 10 MG tablet Take 1 tablet by mouth As Needed for Migraine (at onset of headache) for up to 1 dose.    [DISCONTINUED] amitriptyline (ELAVIL) 25 MG tablet Take 1 tablet by mouth Every Night.    [DISCONTINUED] DULoxetine (CYMBALTA) 30 MG capsule Take 1 capsule by mouth Daily.    [DISCONTINUED] Aimovig 70 MG/ML auto-injector ADMINISTER 1 ML UNDER THE SKIN 1 TIME FOR 1 DOSE AS DIRECTED (Patient not taking: Reported on 2023)     No current facility-administered medications on file prior to visit.      No Known Allergies   Health Maintenance Due   Topic Date Due    COVID-19 Vaccine (1) Never done    ANNUAL PHYSICAL  Never done    PAP SMEAR  Never done      Cherie Soriano presents to [facility]  History of Present Illness  Pt notes she is sleeping better but having vivid and weird dreams that will wake her up and she will be shaking at times. No significant improvement  with anxiety. Her significant other states she is less irritable but she doesn't feel that way.     Migraines: notes have 2 migraines since last visit and starting on amitriptyline. Continues to have daily headaches but are not migraines.     Objective   Vital Signs:   /76   Pulse 98   Temp 97.8 °F (36.6 °C)   Wt 81.6 kg (180 lb)   SpO2 98%   BMI 28.19 kg/m²     Review of Systems   Cardiovascular:  Negative for chest pain and palpitations.    Physical Exam  Vitals reviewed.   Constitutional:       General: She is not in acute distress.     Appearance: Normal appearance. She is well-developed.   Eyes:      Conjunctiva/sclera: Conjunctivae normal.      Pupils: Pupils are equal, round, and reactive to light.   Cardiovascular:      Rate and Rhythm: Normal rate and regular rhythm.      Heart sounds: Normal heart sounds.   Pulmonary:      Effort: Pulmonary effort is normal.      Breath sounds: Normal breath sounds.   Musculoskeletal:      Cervical back: Neck supple.      Right lower leg: No edema.      Left lower leg: No edema.   Skin:     General: Skin is warm and dry.   Neurological:      Mental Status: She is alert and oriented to person, place, and time.   Psychiatric:         Mood and Affect: Mood and affect normal.         Behavior: Behavior normal.         Thought Content: Thought content normal.         Judgment: Judgment normal.      Result Review :                 Assessment and Plan    Diagnoses and all orders for this visit:    1. Anxiety  -     DULoxetine (CYMBALTA) 30 MG capsule; Take 1 capsule by mouth 2 (Two) Times a Day.  Dispense: 60 capsule; Refill: 2    2. Depression, unspecified depression type  -     DULoxetine (CYMBALTA) 30 MG capsule; Take 1 capsule by mouth 2 (Two) Times a Day.  Dispense: 60 capsule; Refill: 2    3. Acute nonintractable headache, unspecified headache type  -     amitriptyline (ELAVIL) 50 MG tablet; Take 1 tablet by mouth Every Night.  Dispense: 30 tablet; Refill:  2           Will increase duloxetine to 30mg twice daily and increase amitriptyline to 50mg nightly. Pt to monitor for SE and notify with issues. Follow up in 3 months for refills with no issues or concerns.        Follow Up   Return in about 3 months (around 12/22/2023), or if symptoms worsen or fail to improve.  Patient was given instructions and counseling regarding her condition or for health maintenance advice. Please see specific information pulled into the AVS if appropriate.

## 2023-10-17 DIAGNOSIS — R51.9 ACUTE NONINTRACTABLE HEADACHE, UNSPECIFIED HEADACHE TYPE: ICD-10-CM

## 2023-10-17 RX ORDER — RIZATRIPTAN BENZOATE 10 MG/1
10 TABLET ORAL AS NEEDED
Qty: 20 TABLET | Refills: 0 | Status: SHIPPED | OUTPATIENT
Start: 2023-10-17

## 2023-11-15 ENCOUNTER — OFFICE VISIT (OUTPATIENT)
Dept: FAMILY MEDICINE CLINIC | Facility: CLINIC | Age: 33
End: 2023-11-15
Payer: COMMERCIAL

## 2023-11-15 VITALS
BODY MASS INDEX: 29.82 KG/M2 | SYSTOLIC BLOOD PRESSURE: 118 MMHG | HEIGHT: 67 IN | WEIGHT: 190 LBS | TEMPERATURE: 97.7 F | DIASTOLIC BLOOD PRESSURE: 88 MMHG | OXYGEN SATURATION: 99 % | HEART RATE: 91 BPM

## 2023-11-15 DIAGNOSIS — G43.709 CHRONIC MIGRAINE WITHOUT AURA WITHOUT STATUS MIGRAINOSUS, NOT INTRACTABLE: ICD-10-CM

## 2023-11-15 DIAGNOSIS — R51.9 ACUTE NONINTRACTABLE HEADACHE, UNSPECIFIED HEADACHE TYPE: ICD-10-CM

## 2023-11-15 DIAGNOSIS — F41.9 ANXIETY: Primary | ICD-10-CM

## 2023-11-15 DIAGNOSIS — F32.A DEPRESSION, UNSPECIFIED DEPRESSION TYPE: ICD-10-CM

## 2023-11-15 DIAGNOSIS — G47.00 INSOMNIA, UNSPECIFIED TYPE: ICD-10-CM

## 2023-11-15 PROCEDURE — 3074F SYST BP LT 130 MM HG: CPT | Performed by: NURSE PRACTITIONER

## 2023-11-15 PROCEDURE — 1160F RVW MEDS BY RX/DR IN RCRD: CPT | Performed by: NURSE PRACTITIONER

## 2023-11-15 PROCEDURE — 3079F DIAST BP 80-89 MM HG: CPT | Performed by: NURSE PRACTITIONER

## 2023-11-15 PROCEDURE — 1159F MED LIST DOCD IN RCRD: CPT | Performed by: NURSE PRACTITIONER

## 2023-11-15 PROCEDURE — 99214 OFFICE O/P EST MOD 30 MIN: CPT | Performed by: NURSE PRACTITIONER

## 2023-11-15 RX ORDER — TRAZODONE HYDROCHLORIDE 50 MG/1
50 TABLET ORAL NIGHTLY
Qty: 30 TABLET | Refills: 1 | Status: SHIPPED | OUTPATIENT
Start: 2023-11-15 | End: 2023-11-15

## 2023-11-15 RX ORDER — QUETIAPINE FUMARATE 50 MG/1
50 TABLET, FILM COATED ORAL NIGHTLY
Qty: 30 TABLET | Refills: 1 | Status: SHIPPED | OUTPATIENT
Start: 2023-11-15

## 2023-11-15 NOTE — PROGRESS NOTES
Chief Complaint  Anxiety (Wants to change medications)    Subjective        Past Medical History:   Diagnosis Date    Allergic     Seasonal    Anxiety     Benign essential hypertension     Blood clotting disorder     Depression     GERD (gastroesophageal reflux disease)     Headache     History of medical problems     Hashimotos    Rubella non-immune status, antepartum 2020    Seasonal allergies     Shoulder dystocia during labor and delivery 2021     Social History     Tobacco Use    Smoking status: Former     Packs/day: 0.50     Years: 10.00     Additional pack years: 0.00     Total pack years: 5.00     Types: Cigarettes     Quit date: 2019     Years since quittin.2     Passive exposure: Past    Smokeless tobacco: Never   Vaping Use    Vaping Use: Former   Substance Use Topics    Alcohol use: Yes     Alcohol/week: 1.0 - 2.0 standard drink of alcohol     Types: 1 - 2 Glasses of wine per week     Comment: Current some day     Drug use: Never      Current Outpatient Medications on File Prior to Visit   Medication Sig    ondansetron ODT (ZOFRAN-ODT) 4 MG disintegrating tablet Place 1 tablet on the tongue Every 8 (Eight) Hours As Needed for Nausea.    rizatriptan (MAXALT) 10 MG tablet Take 1 tablet by mouth As Needed for Migraine (at onset of headache).    [DISCONTINUED] amitriptyline (ELAVIL) 50 MG tablet Take 1 tablet by mouth Every Night. (Patient not taking: Reported on 11/15/2023)    [DISCONTINUED] DULoxetine (CYMBALTA) 30 MG capsule Take 1 capsule by mouth 2 (Two) Times a Day. (Patient not taking: Reported on 11/15/2023)     No current facility-administered medications on file prior to visit.      No Known Allergies   Health Maintenance Due   Topic Date Due    COVID-19 Vaccine (1) Never done    ANNUAL PHYSICAL  Never done    PAP SMEAR  Never done    INFLUENZA VACCINE  2023      Cherie Soriano presents to [facility]  History of Present Illness  Here to  "follow up on anxiety. Pt states she feels fatigued throughout the day with the amitriptyline and notes weight gain of about 11lbs. Pt states the Cymbalta isn't helping as much and dosage has already been increased. Prior to Cymbalta she was on Paxil and Lexapro and noted side effects of the medications. Easily irritable and states she will feel her face get flushed and jittery and concerns that BP might be going up. Notes symptoms about 2-3 weeks and stopped medication about 10 days ago. HR has been elevated on HR recently. Pts  notes concern for Bipolar and pt reports her father was recently dx'd with Bipolar but pt denies having \"highs\".     Pt is getting nearly daily headaches but was about 1x/week when taking amitriptyline. Has been on propranolol and topiramate in the past. Notes she     Answers submitted by the patient for this visit:  Other (Submitted on 11/8/2023)  Please describe your symptoms.: Weight gain, excessive sleepiness, migraines/headaches, anxiety, depression.  Have you had these symptoms before?: Yes  How long have you been having these symptoms?: Greater than 2 weeks  Please list any medications you are currently taking for this condition.: None. I stopped when I noticed I had gained around 10 pounds  Please describe any probable cause for these symptoms. : The medicine  Primary Reason for Visit (Submitted on 11/8/2023)  What is the primary reason for your visit?: Other    Objective   Vital Signs:   /88 (BP Location: Left arm)   Pulse 91   Temp 97.7 °F (36.5 °C)   Ht 170.2 cm (67\")   Wt 86.2 kg (190 lb)   SpO2 99%   BMI 29.76 kg/m²     Review of Systems   Physical Exam  Vitals reviewed.   Constitutional:       General: She is not in acute distress.     Appearance: Normal appearance. She is well-developed.   Eyes:      Conjunctiva/sclera: Conjunctivae normal.      Pupils: Pupils are equal, round, and reactive to light.   Cardiovascular:      Rate and Rhythm: Normal rate and " regular rhythm.      Heart sounds: Normal heart sounds.   Pulmonary:      Effort: Pulmonary effort is normal.      Breath sounds: Normal breath sounds.   Musculoskeletal:      Cervical back: Neck supple.      Right lower leg: No edema.      Left lower leg: No edema.   Skin:     General: Skin is warm and dry.   Neurological:      Mental Status: She is alert and oriented to person, place, and time.   Psychiatric:         Mood and Affect: Mood and affect normal.         Behavior: Behavior normal.         Thought Content: Thought content normal.         Judgment: Judgment normal.        Result Review :   The following data was reviewed by: SAUMYA Schneider on 11/15/2023:  Common labs          2/13/2023    14:37   Common Labs   Glucose 77    BUN 9    Creatinine 0.64    Sodium 140    Potassium 4.0    Chloride 105    Calcium 9.3    Albumin 4.7    Total Bilirubin 0.4    Alkaline Phosphatase 77    AST (SGOT) 17    ALT (SGPT) 12    Total Cholesterol 148    Triglycerides 52    HDL Cholesterol 48    LDL Cholesterol  89      TSH          2/13/2023    14:37   TSH   TSH 1.310                Assessment and Plan    Diagnoses and all orders for this visit:    1. Anxiety (Primary)  -     Vortioxetine HBr (Trintellix) 5 MG tablet tablet; Take 1 tablet by mouth Daily With Breakfast.  Dispense: 30 tablet; Refill: 1  -     Ambulatory Referral to Behavioral Health  -     QUEtiapine (SEROquel) 50 MG tablet; Take 1 tablet by mouth Every Night.  Dispense: 30 tablet; Refill: 1    2. Depression, unspecified depression type  -     Vortioxetine HBr (Trintellix) 5 MG tablet tablet; Take 1 tablet by mouth Daily With Breakfast.  Dispense: 30 tablet; Refill: 1  -     Ambulatory Referral to Behavioral Health  -     QUEtiapine (SEROquel) 50 MG tablet; Take 1 tablet by mouth Every Night.  Dispense: 30 tablet; Refill: 1    3. Acute nonintractable headache, unspecified headache type    4. Chronic migraine without aura without status migrainosus, not  intractable    5. Insomnia, unspecified type  -     Discontinue: traZODone (DESYREL) 50 MG tablet; Take 1 tablet by mouth Every Night.  Dispense: 30 tablet; Refill: 1  -     QUEtiapine (SEROquel) 50 MG tablet; Take 1 tablet by mouth Every Night.  Dispense: 30 tablet; Refill: 1        We will refer patient to behavioral health for full evaluation and medication management as patient has tried and failed multiple medications.  We will start patient on Trintellix and Seroquel at night instructed patient to start them  by 2 to 3 days.          Follow Up   Return in about 4 weeks (around 12/13/2023) for Recheck.  Patient was given instructions and counseling regarding her condition or for health maintenance advice. Please see specific information pulled into the AVS if appropriate.

## 2023-11-16 ENCOUNTER — PATIENT MESSAGE (OUTPATIENT)
Dept: FAMILY MEDICINE CLINIC | Facility: CLINIC | Age: 33
End: 2023-11-16
Payer: COMMERCIAL

## 2023-11-16 RX ORDER — TRIAMCINOLONE ACETONIDE 1 MG/G
1 OINTMENT TOPICAL 2 TIMES DAILY
Qty: 30 G | Refills: 0 | Status: SHIPPED | OUTPATIENT
Start: 2023-11-16

## 2023-11-16 NOTE — TELEPHONE ENCOUNTER
From: Cherie Soriano  To: Isela Doe  Sent: 11/16/2023 2:24 PM EST  Subject: Steroid cream    Towards the end of the visit yesterday, we discussed a topical steroid for the rash on my hand. The pharmacy doesn't have it in their system.

## 2023-11-17 DIAGNOSIS — R51.9 ACUTE NONINTRACTABLE HEADACHE, UNSPECIFIED HEADACHE TYPE: ICD-10-CM

## 2023-11-17 DIAGNOSIS — R11.0 NAUSEA: ICD-10-CM

## 2023-11-20 RX ORDER — RIZATRIPTAN BENZOATE 10 MG/1
10 TABLET ORAL AS NEEDED
Qty: 20 TABLET | Refills: 0 | Status: SHIPPED | OUTPATIENT
Start: 2023-11-20

## 2023-11-20 RX ORDER — ONDANSETRON 4 MG/1
4 TABLET, ORALLY DISINTEGRATING ORAL EVERY 8 HOURS PRN
Qty: 12 TABLET | Refills: 1 | Status: SHIPPED | OUTPATIENT
Start: 2023-11-20

## 2023-11-27 ENCOUNTER — PATIENT MESSAGE (OUTPATIENT)
Dept: FAMILY MEDICINE CLINIC | Facility: CLINIC | Age: 33
End: 2023-11-27
Payer: COMMERCIAL

## 2023-11-27 DIAGNOSIS — R51.9 ACUTE NONINTRACTABLE HEADACHE, UNSPECIFIED HEADACHE TYPE: Primary | ICD-10-CM

## 2023-11-27 NOTE — TELEPHONE ENCOUNTER
From: Cherie Soriano  To: Isela Doe  Sent: 11/27/2023 10:40 AM EST  Subject: Headaches    How long does it take for this trintellix to work for my headaches? I am still waking up daily with pretty bad headaches and some days migraines. Should we try something else or maybe a neurologist? Its making daily life a struggle.

## 2024-01-14 DIAGNOSIS — G47.00 INSOMNIA, UNSPECIFIED TYPE: ICD-10-CM

## 2024-01-14 DIAGNOSIS — F41.9 ANXIETY: ICD-10-CM

## 2024-01-14 DIAGNOSIS — F32.A DEPRESSION, UNSPECIFIED DEPRESSION TYPE: ICD-10-CM

## 2024-01-15 RX ORDER — QUETIAPINE FUMARATE 50 MG/1
50 TABLET, FILM COATED ORAL NIGHTLY
Qty: 30 TABLET | Refills: 0 | Status: SHIPPED | OUTPATIENT
Start: 2024-01-15

## 2024-01-16 DIAGNOSIS — F41.9 ANXIETY: ICD-10-CM

## 2024-01-16 DIAGNOSIS — F32.A DEPRESSION, UNSPECIFIED DEPRESSION TYPE: ICD-10-CM

## 2024-01-16 RX ORDER — VORTIOXETINE 5 MG/1
5 TABLET, FILM COATED ORAL
Qty: 30 TABLET | Refills: 0 | Status: SHIPPED | OUTPATIENT
Start: 2024-01-16

## 2024-02-16 ENCOUNTER — OFFICE VISIT (OUTPATIENT)
Dept: PSYCHIATRY | Facility: CLINIC | Age: 34
End: 2024-02-16
Payer: COMMERCIAL

## 2024-02-16 VITALS
HEART RATE: 79 BPM | SYSTOLIC BLOOD PRESSURE: 116 MMHG | HEIGHT: 68 IN | WEIGHT: 195.4 LBS | DIASTOLIC BLOOD PRESSURE: 84 MMHG | BODY MASS INDEX: 29.61 KG/M2

## 2024-02-16 DIAGNOSIS — F12.90 MARIJUANA USE: ICD-10-CM

## 2024-02-16 DIAGNOSIS — F33.2 SEVERE EPISODE OF RECURRENT MAJOR DEPRESSIVE DISORDER, WITHOUT PSYCHOTIC FEATURES: ICD-10-CM

## 2024-02-16 DIAGNOSIS — F43.12 NIGHTMARES ASSOCIATED WITH CHRONIC POST-TRAUMATIC STRESS DISORDER: ICD-10-CM

## 2024-02-16 DIAGNOSIS — F43.10 POST TRAUMATIC STRESS DISORDER (PTSD): Primary | ICD-10-CM

## 2024-02-16 DIAGNOSIS — F51.5 NIGHTMARES ASSOCIATED WITH CHRONIC POST-TRAUMATIC STRESS DISORDER: ICD-10-CM

## 2024-02-16 DIAGNOSIS — F41.1 GAD (GENERALIZED ANXIETY DISORDER): ICD-10-CM

## 2024-02-16 RX ORDER — HYDROXYZINE HYDROCHLORIDE 25 MG/1
25 TABLET, FILM COATED ORAL 3 TIMES DAILY PRN
Qty: 90 TABLET | Refills: 1 | Status: SHIPPED | OUTPATIENT
Start: 2024-02-16

## 2024-02-16 RX ORDER — PRAZOSIN HYDROCHLORIDE 1 MG/1
1 CAPSULE ORAL NIGHTLY
Qty: 90 CAPSULE | Refills: 0 | Status: SHIPPED | OUTPATIENT
Start: 2024-02-16

## 2024-02-16 RX ORDER — GLYCERIN/MIN OIL/POLYCARBOPHIL
1 GEL WITH APPLICATOR (GRAM) VAGINAL DAILY
COMMUNITY
Start: 2023-12-04 | End: 2024-02-16

## 2024-02-16 RX ORDER — TRAZODONE HYDROCHLORIDE 50 MG/1
TABLET ORAL
COMMUNITY
Start: 2023-11-15 | End: 2024-02-16

## 2024-02-28 ENCOUNTER — PATIENT MESSAGE (OUTPATIENT)
Dept: FAMILY MEDICINE CLINIC | Facility: CLINIC | Age: 34
End: 2024-02-28
Payer: COMMERCIAL

## 2024-02-28 DIAGNOSIS — B00.1 FEVER BLISTER: Primary | ICD-10-CM

## 2024-02-28 RX ORDER — VALACYCLOVIR HYDROCHLORIDE 1 G/1
1000 TABLET, FILM COATED ORAL 2 TIMES DAILY
Qty: 10 TABLET | Refills: 0 | Status: SHIPPED | OUTPATIENT
Start: 2024-02-28

## 2024-02-29 NOTE — TELEPHONE ENCOUNTER
From: Cherie Soriano  To: Isela Doe  Sent: 2/28/2024 10:19 AM EST  Subject: Cold sore    I get cold sores regularly when I get nervous or an upset stomach and have my entire life. They are usually smaller and manageable. Well, the stomach bug is making its way through our house and I now have a very large and painful sore. Is there anything that could be called in for this? I don't want to share any germs with the office if possible. I have tried ice, taking pepto to calm the stomach, and Lysine+ with no relief. It seems to keep getting bigger and swelling my lip as well. I haven't had one this bad in about 15 years.

## 2024-03-19 ENCOUNTER — OFFICE VISIT (OUTPATIENT)
Dept: PSYCHIATRY | Facility: CLINIC | Age: 34
End: 2024-03-19
Payer: COMMERCIAL

## 2024-03-19 VITALS
DIASTOLIC BLOOD PRESSURE: 86 MMHG | BODY MASS INDEX: 29.64 KG/M2 | HEART RATE: 88 BPM | SYSTOLIC BLOOD PRESSURE: 129 MMHG | HEIGHT: 68 IN | WEIGHT: 195.6 LBS

## 2024-03-19 DIAGNOSIS — F33.1 MAJOR DEPRESSIVE DISORDER, RECURRENT, MODERATE: ICD-10-CM

## 2024-03-19 DIAGNOSIS — F43.12 NIGHTMARES ASSOCIATED WITH CHRONIC POST-TRAUMATIC STRESS DISORDER: ICD-10-CM

## 2024-03-19 DIAGNOSIS — F43.10 POST TRAUMATIC STRESS DISORDER (PTSD): Primary | ICD-10-CM

## 2024-03-19 DIAGNOSIS — F51.5 NIGHTMARES ASSOCIATED WITH CHRONIC POST-TRAUMATIC STRESS DISORDER: ICD-10-CM

## 2024-03-19 DIAGNOSIS — F41.1 GAD (GENERALIZED ANXIETY DISORDER): ICD-10-CM

## 2024-03-19 RX ORDER — FLUOXETINE HYDROCHLORIDE 20 MG/1
20 CAPSULE ORAL DAILY
Qty: 30 CAPSULE | Refills: 1 | Status: SHIPPED | OUTPATIENT
Start: 2024-03-19

## 2024-03-19 RX ORDER — PRAZOSIN HYDROCHLORIDE 1 MG/1
2 CAPSULE ORAL NIGHTLY
Start: 2024-03-19
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
## 2024-04-13 DIAGNOSIS — R51.9 ACUTE NONINTRACTABLE HEADACHE, UNSPECIFIED HEADACHE TYPE: ICD-10-CM

## 2024-04-16 RX ORDER — RIZATRIPTAN BENZOATE 10 MG/1
10 TABLET ORAL AS NEEDED
Qty: 20 TABLET | Refills: 0 | Status: SHIPPED | OUTPATIENT
Start: 2024-04-16

## 2024-04-23 ENCOUNTER — PATIENT ROUNDING (BHMG ONLY) (OUTPATIENT)
Dept: NEUROLOGY | Facility: CLINIC | Age: 34
End: 2024-04-23
Payer: COMMERCIAL

## 2024-04-23 ENCOUNTER — OFFICE VISIT (OUTPATIENT)
Dept: NEUROLOGY | Facility: CLINIC | Age: 34
End: 2024-04-23
Payer: COMMERCIAL

## 2024-04-23 VITALS
WEIGHT: 192.6 LBS | DIASTOLIC BLOOD PRESSURE: 95 MMHG | BODY MASS INDEX: 29.19 KG/M2 | SYSTOLIC BLOOD PRESSURE: 131 MMHG | HEIGHT: 68 IN | HEART RATE: 103 BPM

## 2024-04-23 DIAGNOSIS — R11.0 NAUSEA: ICD-10-CM

## 2024-04-23 DIAGNOSIS — G43.719 INTRACTABLE CHRONIC MIGRAINE WITHOUT AURA AND WITHOUT STATUS MIGRAINOSUS: Primary | ICD-10-CM

## 2024-04-23 RX ORDER — ONDANSETRON 4 MG/1
4 TABLET, ORALLY DISINTEGRATING ORAL EVERY 8 HOURS PRN
Qty: 20 TABLET | Refills: 5 | Status: SHIPPED | OUTPATIENT
Start: 2024-04-23

## 2024-04-23 RX ORDER — RIZATRIPTAN BENZOATE 10 MG/1
10 TABLET ORAL AS NEEDED
Qty: 9 TABLET | Refills: 5 | Status: SHIPPED | OUTPATIENT
Start: 2024-04-23

## 2024-04-23 RX ORDER — LEVONORGESTREL 52 MG/1
1 INTRAUTERINE DEVICE INTRAUTERINE
COMMUNITY

## 2024-04-23 RX ORDER — ATOGEPANT 60 MG/1
60 TABLET ORAL DAILY
Qty: 30 TABLET | Refills: 5 | Status: SHIPPED | OUTPATIENT
Start: 2024-04-23

## 2024-04-23 NOTE — PROGRESS NOTES
Sent patient rounding through [employer] for response.     Rotation Flap Text: The defect edges were debeveled with a #15 scalpel blade.  Given the location of the defect, shape of the defect and the proximity to free margins a rotation flap was deemed most appropriate.  Using a sterile surgical marker, an appropriate rotation flap was drawn incorporating the defect and placing the expected incisions within the relaxed skin tension lines where possible.    The area thus outlined was incised deep to adipose tissue with a #15 scalpel blade.  The skin margins were undermined to an appropriate distance in all directions utilizing iris scissors.

## 2024-04-23 NOTE — PROGRESS NOTES
"Chief Complaint  Neurologic Problem    Subjective          Cherie Soriano presents to [facility]  History of Present Illness  She reports that she developed headaches many years ago. Since that time, her headaches have progressively worsened.   Currently, she reports headaches that are located frontal and occipital . She characterizes the headaches as 8/10 in severity, throbbing and pressure in nature with associated photophobia, phonophobia, and nausea. She reports headaches last 6+ hours. She reports 20+ headache days per month, with more severe episodes 8 days per month. She denies associated aura. She denies focal numbness, weakness, speech, and vision changes.   Triggers: inadequate sleep and lights   Symptoms improved by: Dark quiet room and Sleep   She states she is sleeping well. Reports getting 10 hours of sleep per night. denies snoring. Reports unrefreshing sleep.   Prior prophylactic medications include: amitriptyline, topiramate,  Aimovig, prazosin, BB contraindicated d/t med interactions,   She  uses abortive therapy such as: imitrex, rizatriptan, zomig, naproxen,   Caffeine Use: 1 servings daily  Childbearing potential : mirena   History of Kidney Stones: No  She denies a family history of cerebral aneurysm.            Objective   Vital Signs:   /95   Pulse 103   Ht 172.7 cm (68\")   Wt 87.4 kg (192 lb 9.6 oz)   BMI 29.28 kg/m²     Physical Exam  HENT:      Head: Normocephalic.   Pulmonary:      Effort: Pulmonary effort is normal.   Neurological:      Mental Status: She is alert and oriented to person, place, and time.      Sensory: Sensation is intact.      Motor: Motor function is intact.      Coordination: Coordination is intact.      Deep Tendon Reflexes: Reflexes are normal and symmetric.        Neurologic Exam     Mental Status   Oriented to person, place, and time.        Result Review :               Assessment and Plan    Diagnoses and all " orders for this visit:    1. Intractable chronic migraine without aura and without status migrainosus (Primary)  Assessment & Plan:  Will start qulipta for preventative therapy and maxalt PRN for abortive therapy. Zofran for migraine induced nausea.             Orders:  -     rizatriptan (MAXALT) 10 MG tablet; Take 1 tablet by mouth As Needed for Migraine (at onset of headache).  Dispense: 9 tablet; Refill: 5  -     ondansetron ODT (ZOFRAN-ODT) 4 MG disintegrating tablet; Place 1 tablet on the tongue Every 8 (Eight) Hours As Needed for Nausea.  Dispense: 20 tablet; Refill: 5    2. Nausea  -     ondansetron ODT (ZOFRAN-ODT) 4 MG disintegrating tablet; Place 1 tablet on the tongue Every 8 (Eight) Hours As Needed for Nausea.  Dispense: 20 tablet; Refill: 5    Other orders  -     Atogepant (Qulipta) 60 MG tablet; Take 1 tablet by mouth Daily.  Dispense: 30 tablet; Refill: 5      I spent 40 minutes caring for Cherie on this date of service. This time includes time spent by me in the following activities:preparing for the visit, reviewing tests, obtaining and/or reviewing a separately obtained history, performing a medically appropriate examination and/or evaluation , counseling and educating the patient/family/caregiver, ordering medications, tests, or procedures, documenting information in the medical record, and independently interpreting results and communicating that information with the patient/family/caregiver  Follow Up   No follow-ups on file.  Patient was given instructions and counseling regarding her condition or for health maintenance advice. Please see specific information pulled into the AVS if appropriate.

## 2024-04-25 ENCOUNTER — PRIOR AUTHORIZATION (OUTPATIENT)
Dept: NEUROLOGY | Facility: CLINIC | Age: 34
End: 2024-04-25
Payer: COMMERCIAL

## 2024-04-26 NOTE — ASSESSMENT & PLAN NOTE
Will start qulipta for preventative therapy and maxalt PRN for abortive therapy. Zofran for migraine induced nausea.

## 2024-04-30 ENCOUNTER — PATIENT MESSAGE (OUTPATIENT)
Dept: NEUROLOGY | Facility: CLINIC | Age: 34
End: 2024-04-30
Payer: COMMERCIAL

## 2024-05-06 ENCOUNTER — OFFICE VISIT (OUTPATIENT)
Dept: PSYCHIATRY | Facility: CLINIC | Age: 34
End: 2024-05-06
Payer: COMMERCIAL

## 2024-05-06 VITALS
WEIGHT: 193.2 LBS | HEIGHT: 68 IN | DIASTOLIC BLOOD PRESSURE: 92 MMHG | HEART RATE: 75 BPM | SYSTOLIC BLOOD PRESSURE: 129 MMHG | BODY MASS INDEX: 29.28 KG/M2

## 2024-05-06 DIAGNOSIS — F41.1 GAD (GENERALIZED ANXIETY DISORDER): ICD-10-CM

## 2024-05-06 DIAGNOSIS — F43.12 NIGHTMARES ASSOCIATED WITH CHRONIC POST-TRAUMATIC STRESS DISORDER: ICD-10-CM

## 2024-05-06 DIAGNOSIS — F33.0 MAJOR DEPRESSIVE DISORDER, RECURRENT, MILD: ICD-10-CM

## 2024-05-06 DIAGNOSIS — F43.10 POST TRAUMATIC STRESS DISORDER (PTSD): Primary | ICD-10-CM

## 2024-05-06 DIAGNOSIS — F51.5 NIGHTMARES ASSOCIATED WITH CHRONIC POST-TRAUMATIC STRESS DISORDER: ICD-10-CM

## 2024-05-06 PROCEDURE — 1159F MED LIST DOCD IN RCRD: CPT | Performed by: PSYCHIATRY & NEUROLOGY

## 2024-05-06 PROCEDURE — 1160F RVW MEDS BY RX/DR IN RCRD: CPT | Performed by: PSYCHIATRY & NEUROLOGY

## 2024-05-06 PROCEDURE — 99214 OFFICE O/P EST MOD 30 MIN: CPT | Performed by: PSYCHIATRY & NEUROLOGY

## 2024-05-06 PROCEDURE — 3080F DIAST BP >= 90 MM HG: CPT | Performed by: PSYCHIATRY & NEUROLOGY

## 2024-05-06 PROCEDURE — 90833 PSYTX W PT W E/M 30 MIN: CPT | Performed by: PSYCHIATRY & NEUROLOGY

## 2024-05-06 PROCEDURE — 3074F SYST BP LT 130 MM HG: CPT | Performed by: PSYCHIATRY & NEUROLOGY

## 2024-05-06 RX ORDER — PRAZOSIN HYDROCHLORIDE 2 MG/1
2 CAPSULE ORAL NIGHTLY
Qty: 90 CAPSULE | Refills: 0 | Status: SHIPPED | OUTPATIENT
Start: 2024-05-06

## 2024-05-13 DIAGNOSIS — F51.5 NIGHTMARES ASSOCIATED WITH CHRONIC POST-TRAUMATIC STRESS DISORDER: ICD-10-CM

## 2024-05-13 DIAGNOSIS — F43.12 NIGHTMARES ASSOCIATED WITH CHRONIC POST-TRAUMATIC STRESS DISORDER: ICD-10-CM

## 2024-05-13 RX ORDER — PRAZOSIN HYDROCHLORIDE 1 MG/1
1 CAPSULE ORAL NIGHTLY
Qty: 90 CAPSULE | Refills: 0 | OUTPATIENT
Start: 2024-05-13

## 2024-05-13 NOTE — TELEPHONE ENCOUNTER
PRAZOSIN 1MG CAPSULES   The original prescription was discontinued on 3/19/2024 by John Hernandez MD for the following reason: Reorder. Renewing this prescription may not be appropriate.     Last refill: 2/16/2024         Follow up 06/18/2024

## 2024-05-15 DIAGNOSIS — F33.1 MAJOR DEPRESSIVE DISORDER, RECURRENT, MODERATE: ICD-10-CM

## 2024-05-15 DIAGNOSIS — F43.10 POST TRAUMATIC STRESS DISORDER (PTSD): ICD-10-CM

## 2024-05-15 DIAGNOSIS — F41.1 GAD (GENERALIZED ANXIETY DISORDER): ICD-10-CM

## 2024-05-15 RX ORDER — FLUOXETINE HYDROCHLORIDE 20 MG/1
20 CAPSULE ORAL DAILY
Qty: 30 CAPSULE | Refills: 1 | OUTPATIENT
Start: 2024-05-15

## 2024-05-15 NOTE — TELEPHONE ENCOUNTER
The original prescription was discontinued on 5/6/2024 by John Hernandez MD. Renewing this prescription may not be appropriate.     NEXT APPT WITH PROVIDER   Appointment with John Hernandez MD (06/18/2024)     PROVIDER PLEASE ADVISE

## 2024-06-30 DIAGNOSIS — F33.0 MAJOR DEPRESSIVE DISORDER, RECURRENT, MILD: ICD-10-CM

## 2024-06-30 DIAGNOSIS — F41.1 GAD (GENERALIZED ANXIETY DISORDER): ICD-10-CM

## 2024-06-30 DIAGNOSIS — F43.10 POST TRAUMATIC STRESS DISORDER (PTSD): ICD-10-CM

## 2024-07-01 NOTE — TELEPHONE ENCOUNTER
ATTEMPTED TO CONTACT PT(PATIENT)       NO ANSWER      LEFT VOICEMAIL WITH INSTRUCTIONS TO RETURN CALL TO OFFICE AT [phone]

## 2024-07-01 NOTE — TELEPHONE ENCOUNTER
NEXT VISIT WITH PROVIDER   NONE IN [facility]     LAST SEEN BY PROVIDER   Office Visit with John Hernandez MD (05/06/2024)     LAST MED REFILL  sertraline (Zoloft) 50 MG tablet (05/06/2024)

## 2024-07-17 ENCOUNTER — TELEMEDICINE (OUTPATIENT)
Dept: PSYCHIATRY | Facility: CLINIC | Age: 34
End: 2024-07-17
Payer: COMMERCIAL

## 2024-07-17 DIAGNOSIS — F43.10 POST TRAUMATIC STRESS DISORDER (PTSD): Primary | ICD-10-CM

## 2024-07-17 DIAGNOSIS — R41.840 CONCENTRATION DEFICIT: ICD-10-CM

## 2024-07-17 DIAGNOSIS — F41.1 GAD (GENERALIZED ANXIETY DISORDER): ICD-10-CM

## 2024-07-17 DIAGNOSIS — F43.12 NIGHTMARES ASSOCIATED WITH CHRONIC POST-TRAUMATIC STRESS DISORDER: ICD-10-CM

## 2024-07-17 DIAGNOSIS — F33.0 MAJOR DEPRESSIVE DISORDER, RECURRENT, MILD: ICD-10-CM

## 2024-07-17 DIAGNOSIS — F51.5 NIGHTMARES ASSOCIATED WITH CHRONIC POST-TRAUMATIC STRESS DISORDER: ICD-10-CM

## 2024-07-17 RX ORDER — PRAZOSIN HYDROCHLORIDE 2 MG/1
2 CAPSULE ORAL NIGHTLY
Qty: 90 CAPSULE | Refills: 0 | Status: SHIPPED | OUTPATIENT
Start: 2024-07-17

## 2024-07-17 RX ORDER — SERTRALINE HYDROCHLORIDE 100 MG/1
100 TABLET, FILM COATED ORAL NIGHTLY
Qty: 30 TABLET | Refills: 1 | Status: SHIPPED | OUTPATIENT
Start: 2024-07-17
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
## 2024-08-27 ENCOUNTER — OFFICE VISIT (OUTPATIENT)
Dept: NEUROLOGY | Facility: CLINIC | Age: 34
End: 2024-08-27
Payer: COMMERCIAL

## 2024-08-27 VITALS
WEIGHT: 179.7 LBS | SYSTOLIC BLOOD PRESSURE: 116 MMHG | HEIGHT: 68 IN | BODY MASS INDEX: 27.23 KG/M2 | HEART RATE: 80 BPM | DIASTOLIC BLOOD PRESSURE: 75 MMHG

## 2024-08-27 DIAGNOSIS — G43.719 INTRACTABLE CHRONIC MIGRAINE WITHOUT AURA AND WITHOUT STATUS MIGRAINOSUS: Primary | ICD-10-CM

## 2024-08-27 PROCEDURE — 1159F MED LIST DOCD IN RCRD: CPT | Performed by: NURSE PRACTITIONER

## 2024-08-27 PROCEDURE — 1160F RVW MEDS BY RX/DR IN RCRD: CPT | Performed by: NURSE PRACTITIONER

## 2024-08-27 PROCEDURE — 3078F DIAST BP <80 MM HG: CPT | Performed by: NURSE PRACTITIONER

## 2024-08-27 PROCEDURE — 3074F SYST BP LT 130 MM HG: CPT | Performed by: NURSE PRACTITIONER

## 2024-08-27 PROCEDURE — 99213 OFFICE O/P EST LOW 20 MIN: CPT | Performed by: NURSE PRACTITIONER

## 2024-08-27 RX ORDER — RIZATRIPTAN BENZOATE 10 MG/1
10 TABLET ORAL AS NEEDED
Qty: 9 TABLET | Refills: 5 | Status: SHIPPED | OUTPATIENT
Start: 2024-08-27

## 2024-08-27 RX ORDER — ATOGEPANT 60 MG/1
60 TABLET ORAL DAILY
Qty: 30 TABLET | Refills: 5 | Status: SHIPPED | OUTPATIENT
Start: 2024-08-27

## 2024-08-27 NOTE — PROGRESS NOTES
"Chief Complaint  Migraine    Subjective          Cherie Soriano presents to [facility]  History of Present Illness    History of Present Illness  The patient is a 33-year-old female who presents to the office for a follow-up on her migraine management. She is currently on Qulipta for preventative therapy and uses Maxalt as needed.    She reports that her headaches have been manageable recently. The Qulipta appears to have reduced the frequency of her headaches. Currently, she experiences headaches 2 to 3 times a week, but most do not escalate to full-blown migraines. She finds herself needing to take rizatriptan once a week or less frequently.    However, she has been experiencing sinus-related issues for the past few weeks, which have slightly exacerbated her headaches. She had a severe headache this morning that has persisted.       Objective   Vital Signs:   /75   Pulse 80   Ht 172.7 cm (68\")   Wt 81.5 kg (179 lb 11.2 oz)   BMI 27.32 kg/m²     Physical Exam  HENT:      Head: Normocephalic.   Pulmonary:      Effort: Pulmonary effort is normal.   Neurological:      Mental Status: She is alert and oriented to person, place, and time.      Sensory: Sensation is intact.      Motor: Motor function is intact.      Coordination: Coordination is intact.      Deep Tendon Reflexes: Reflexes are normal and symmetric.        Neurologic Exam     Mental Status   Oriented to person, place, and time.        Result Review :               Assessment and Plan    Diagnoses and all orders for this visit:    1. Intractable chronic migraine without aura and without status migrainosus (Primary)  -     rizatriptan (MAXALT) 10 MG tablet; Take 1 tablet by mouth As Needed for Migraine (at onset of headache).  Dispense: 9 tablet; Refill: 5    Other orders  -     Atogepant (Qulipta) 60 MG tablet; Take 1 tablet by mouth Daily.  Dispense: 30 tablet; Refill: 5        Assessment & Plan  1. " Migraine.  Her current regimen of Qulipta and rizatriptan has been effective in managing her migraines, with a noted reduction in severity and frequency. She reports having headaches two to three times a week, with most not progressing to full migraines. She uses rizatriptan once a week or less. Continuation of Qulipta and rizatriptan is advised. Refills for Qulipta and rizatriptan have been sent to the pharmacy. If her headaches worsen, she should inform the office promptly.    Follow-up  The patient will follow up in 6 months.         Follow Up   Return in about 6 months (around 2/27/2025) for Migraine f/u.  Patient was given instructions and counseling regarding her condition or for health maintenance advice. Please see specific information pulled into the AVS if appropriate.       Patient or patient representative verbalized consent for the use of Ambient Listening during the visit with  SAUMYA Edmonds for chart documentation. 8/27/2024  14:44 EDT

## 2024-08-29 ENCOUNTER — TELEMEDICINE (OUTPATIENT)
Dept: PSYCHIATRY | Facility: CLINIC | Age: 34
End: 2024-08-29
Payer: COMMERCIAL

## 2024-08-29 DIAGNOSIS — Z51.81 THERAPEUTIC DRUG MONITORING: ICD-10-CM

## 2024-08-29 DIAGNOSIS — F90.9 ATTENTION DEFICIT HYPERACTIVITY DISORDER (ADHD), UNSPECIFIED ADHD TYPE: ICD-10-CM

## 2024-08-29 DIAGNOSIS — F43.10 POST TRAUMATIC STRESS DISORDER (PTSD): ICD-10-CM

## 2024-08-29 DIAGNOSIS — F33.2 SEVERE EPISODE OF RECURRENT MAJOR DEPRESSIVE DISORDER, WITHOUT PSYCHOTIC FEATURES: Primary | ICD-10-CM

## 2024-08-29 DIAGNOSIS — F41.1 GAD (GENERALIZED ANXIETY DISORDER): ICD-10-CM

## 2024-08-29 RX ORDER — SERTRALINE HYDROCHLORIDE 100 MG/1
100 TABLET, FILM COATED ORAL NIGHTLY
Qty: 30 TABLET | Refills: 1 | Status: SHIPPED | OUTPATIENT
Start: 2024-08-29

## 2024-08-29 RX ORDER — DEXTROAMPHETAMINE SACCHARATE, AMPHETAMINE ASPARTATE MONOHYDRATE, DEXTROAMPHETAMINE SULFATE AND AMPHETAMINE SULFATE 2.5; 2.5; 2.5; 2.5 MG/1; MG/1; MG/1; MG/1
10 CAPSULE, EXTENDED RELEASE ORAL EVERY MORNING
Qty: 30 CAPSULE | Refills: 0 | Status: SHIPPED | OUTPATIENT
Start: 2024-08-29

## 2024-08-29 NOTE — TREATMENT PLAN
Multi-Disciplinary Problems (from Behavioral Health Treatment Plan)      Active Problems       Problem:  Patient Care Overview (Adult)  Start Date: 08/29/24      Problem Details: Treatment Planning for Cherie    Applicable diagnoses/problems:    - ADHD: practice behaviors that are conducive to creating functional routines (set a medication schedule, a sleep schedule, an activity schedule, limits on spending and other potential items of impulsivity); work towards optimizing a balance of utilizing compensatory mechanisms (including medications), accepting aid from applicable social communities/family, and validation of variable attention-stimulus traits in order to optimize daily functioning.  - progress: N/A; new issue  - frequency of intervention: as needed  - duration of treatment: until optimized functional status is met    - Depression: enhance motivation and interest with regard to ego-syntonic items of living via routine building and disruption of inhibitory executive cognitive circuits; challenge withdrawal from ego-syntonic items of living; cultivate smith and satisfaction via a consistent practice of appreciation; consistently practice redirection from intrusive ego-dystonic thoughts towards actionable items to reduce influence these thoughts have in emotions and behavior.  - progress: partial, plateau  - frequency of intervention: as needed  - duration of treatment: until optimized functional status is met    - Anxiety: enhance engagement with regard to ego-syntonic items of living via routine building and disruption of inhibitory executive cognitive circuits; challenge avoidance of ego-syntonic items of living via disruption to perceived barriers; reduce salience of fears and overwhelm with regard to their effects on thinking and behavior.  - progress: partial, plateau  - frequency of intervention: as needed  - duration of treatment: until optimized functional status is met    - PTSD: reduce salience of  trauma history with regard to its impact on thinking and behavior; work towards acceptance of that which has transpired not as acceptable, but has having happened; dampen emotional salience of trigger items via progressive desensitization; enhance motivation and interest with regard to ego-syntonic items of living via routine building and disruption of inhibitory executive cognitive circuits; challenge withdrawal from ego-syntonic items of living; cultivate smith and satisfaction via a consistent practice of appreciation.  - progress: partial, plateau  - frequency of intervention: as needed  - duration of treatment: until optimized functional status is met        Goal Priority Start Date Expected End Date End Date    Plan of Care Review -- 08/29/24 02/27/25 --
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
## 2024-10-01 ENCOUNTER — TELEPHONE (OUTPATIENT)
Dept: PSYCHIATRY | Facility: CLINIC | Age: 34
End: 2024-10-01
Payer: COMMERCIAL

## 2024-10-01 NOTE — TELEPHONE ENCOUNTER
PT(PATIENT) VERIFIED      CONTACTED PT(PATIENT) VIA OVER DUE LAB ORDERS PROTOCOL     Urine Drug Screen - Urine, Clean Catch (2024 12:19)     PT(PATIENT) ADVISED TO COMPLETE ORDER VIA OUTPATIENT LAB SERVICES AT ANY OF THE FOLLOWING [facility] LOCATIONS     [place] [address] [address] [facility] [address]  MON - FRI 7AM-530PM  SAT 8AM-NOON  PHONE #[phone]  FAX#[phone]    [address]   MON-FRI 830AM-430PM  CLOSED SATURDAY AND    PHONE #[phone]    [facility] OUTPATIENT LAB   OPEN M-F 630AM - 5PM   CLOSED SATURDAY AND    [address] [place] [address]   PHONE #[phone]     [facility] OUTPATIENT LAB   [address] [facility] [address]  OPEN M-F 630AM - 5PM   CLOSED SATURDAY AND    PHONE #[phone]     [facility] OUTPATIENT LAB   [address] [facility] [address]  MON - FRI 730AM-4PM  CLOSED SATURDAY AND    PHONE #[phone]    [facility] OUTPATIENT LAB   [address] [facility] [address] [facility] [address]  MON - FRI 730AM-4PM  CLOSED SATURDAY AND    PHONE #[phone]    [facility] OUTPATIENT LAB ([facility] [address] [place])  [address] [facility]   OPEN M-F 6AM - 6PM, SAT 6AM-12PM  PHONE #[phone]   EXT 1615    PT(PATIENT) VERBALIZED UNDERSTANDING AND HAD NO FURTHER QUESTIONS AT THIS TIME

## 2024-10-03 ENCOUNTER — LAB (OUTPATIENT)
Dept: LAB | Facility: HOSPITAL | Age: 34
End: 2024-10-03
Payer: COMMERCIAL

## 2024-10-03 ENCOUNTER — PATIENT MESSAGE (OUTPATIENT)
Dept: NEUROLOGY | Facility: CLINIC | Age: 34
End: 2024-10-03
Payer: COMMERCIAL

## 2024-10-03 ENCOUNTER — TELEMEDICINE (OUTPATIENT)
Dept: PSYCHIATRY | Facility: CLINIC | Age: 34
End: 2024-10-03
Payer: COMMERCIAL

## 2024-10-03 DIAGNOSIS — F90.9 ATTENTION DEFICIT HYPERACTIVITY DISORDER (ADHD), UNSPECIFIED ADHD TYPE: Primary | ICD-10-CM

## 2024-10-03 DIAGNOSIS — F33.1 MAJOR DEPRESSIVE DISORDER, RECURRENT, MODERATE: ICD-10-CM

## 2024-10-03 DIAGNOSIS — F43.10 POST TRAUMATIC STRESS DISORDER (PTSD): ICD-10-CM

## 2024-10-03 DIAGNOSIS — F41.1 GAD (GENERALIZED ANXIETY DISORDER): ICD-10-CM

## 2024-10-03 LAB
AMPHET+METHAMPHET UR QL: POSITIVE
AMPHETAMINES UR QL: NEGATIVE
BARBITURATES UR QL SCN: NEGATIVE
BENZODIAZ UR QL SCN: NEGATIVE
BUPRENORPHINE SERPL-MCNC: NEGATIVE NG/ML
CANNABINOIDS SERPL QL: NEGATIVE
COCAINE UR QL: NEGATIVE
FENTANYL UR-MCNC: NEGATIVE NG/ML
METHADONE UR QL SCN: NEGATIVE
OPIATES UR QL: NEGATIVE
OXYCODONE UR QL SCN: NEGATIVE
PCP UR QL SCN: NEGATIVE
TRICYCLICS UR QL SCN: NEGATIVE

## 2024-10-03 PROCEDURE — 80307 DRUG TEST PRSMV CHEM ANLYZR: CPT | Performed by: PSYCHIATRY & NEUROLOGY

## 2024-10-03 RX ORDER — DEXTROAMPHETAMINE SACCHARATE, AMPHETAMINE ASPARTATE MONOHYDRATE, DEXTROAMPHETAMINE SULFATE AND AMPHETAMINE SULFATE 1.25; 1.25; 1.25; 1.25 MG/1; MG/1; MG/1; MG/1
5 CAPSULE, EXTENDED RELEASE ORAL EVERY MORNING
Qty: 30 CAPSULE | Refills: 0 | Status: SHIPPED | OUTPATIENT
Start: 2024-10-03

## 2024-10-03 RX ORDER — SERTRALINE HYDROCHLORIDE 100 MG/1
150 TABLET, FILM COATED ORAL NIGHTLY
Qty: 45 TABLET | Refills: 1 | Status: SHIPPED | OUTPATIENT
Start: 2024-10-03

## 2024-10-03 NOTE — TELEPHONE ENCOUNTER
Likely migraines of varying degrees.  Make sure she isn't taking OTC therapies.  Can take 6-8 weeks for CoQ10 to help
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
## 2024-10-04 ENCOUNTER — TELEPHONE (OUTPATIENT)
Dept: PSYCHIATRY | Facility: CLINIC | Age: 34
End: 2024-10-04
Payer: COMMERCIAL

## 2024-10-17 DIAGNOSIS — F51.5 NIGHTMARES ASSOCIATED WITH CHRONIC POST-TRAUMATIC STRESS DISORDER: ICD-10-CM

## 2024-10-17 DIAGNOSIS — F43.12 NIGHTMARES ASSOCIATED WITH CHRONIC POST-TRAUMATIC STRESS DISORDER: ICD-10-CM

## 2024-10-17 RX ORDER — PRAZOSIN HYDROCHLORIDE 2 MG/1
2 CAPSULE ORAL NIGHTLY
Qty: 90 CAPSULE | Refills: 0 | Status: SHIPPED | OUTPATIENT
Start: 2024-10-17

## 2024-10-17 NOTE — TELEPHONE ENCOUNTER
NEXT VISIT WITH PROVIDER   NONE IN [facility]     LAST SEEN BY PROVIDER   Telemedicine with John Hernandez MD (10/03/2024)     LAST MED REFILL  prazosin (MINIPRESS) 2 MG capsule (07/17/2024)

## 2024-10-17 NOTE — TELEPHONE ENCOUNTER
PT(PATIENT) VERIFIED     NEXT APPT WITH PROVIDER   Appointment with John Hernandez MD (2024)     PLEASE ADVISE

## 2024-11-16 DIAGNOSIS — F41.1 GAD (GENERALIZED ANXIETY DISORDER): ICD-10-CM

## 2024-11-16 DIAGNOSIS — F43.10 POST TRAUMATIC STRESS DISORDER (PTSD): ICD-10-CM

## 2024-11-18 RX ORDER — SERTRALINE HYDROCHLORIDE 100 MG/1
100 TABLET, FILM COATED ORAL NIGHTLY
Qty: 30 TABLET | Refills: 1 | Status: SHIPPED | OUTPATIENT
Start: 2024-11-18

## 2024-11-18 NOTE — TELEPHONE ENCOUNTER
NEXT VISIT WITH PROVIDER   Appointment with John Hernandez MD (11/27/2024)     LAST SEEN BY PROVIDER   Telemedicine with John Hernandez MD (10/03/2024)     LAST MED REFILL  sertraline (ZOLOFT) 100 MG tablet (10/03/2024)     TOO SOON FOR REFILL     PROVIDER PLEASE ADVISE

## 2024-11-27 ENCOUNTER — TELEMEDICINE (OUTPATIENT)
Dept: PSYCHIATRY | Facility: CLINIC | Age: 34
End: 2024-11-27
Payer: COMMERCIAL

## 2024-11-27 DIAGNOSIS — F90.9 ATTENTION DEFICIT HYPERACTIVITY DISORDER (ADHD), UNSPECIFIED ADHD TYPE: Primary | ICD-10-CM

## 2024-11-27 DIAGNOSIS — F41.1 GAD (GENERALIZED ANXIETY DISORDER): ICD-10-CM

## 2024-11-27 DIAGNOSIS — F33.0 MAJOR DEPRESSIVE DISORDER, RECURRENT, MILD: ICD-10-CM

## 2024-11-27 DIAGNOSIS — F43.10 POST TRAUMATIC STRESS DISORDER (PTSD): ICD-10-CM

## 2024-11-27 RX ORDER — SERTRALINE HYDROCHLORIDE 100 MG/1
150 TABLET, FILM COATED ORAL NIGHTLY
Start: 2024-11-27 | End: 2024-11-27 | Stop reason: SDUPTHER

## 2024-11-27 RX ORDER — DEXTROAMPHETAMINE SACCHARATE, AMPHETAMINE ASPARTATE MONOHYDRATE, DEXTROAMPHETAMINE SULFATE AND AMPHETAMINE SULFATE 2.5; 2.5; 2.5; 2.5 MG/1; MG/1; MG/1; MG/1
10 CAPSULE, EXTENDED RELEASE ORAL EVERY MORNING
Qty: 30 CAPSULE | Refills: 0 | Status: SHIPPED | OUTPATIENT
Start: 2024-12-26

## 2024-11-27 RX ORDER — SERTRALINE HYDROCHLORIDE 100 MG/1
200 TABLET, FILM COATED ORAL NIGHTLY
Qty: 180 TABLET | Refills: 0 | Status: SHIPPED | OUTPATIENT
Start: 2024-11-27

## 2024-11-27 RX ORDER — DEXTROAMPHETAMINE SACCHARATE, AMPHETAMINE ASPARTATE MONOHYDRATE, DEXTROAMPHETAMINE SULFATE AND AMPHETAMINE SULFATE 2.5; 2.5; 2.5; 2.5 MG/1; MG/1; MG/1; MG/1
10 CAPSULE, EXTENDED RELEASE ORAL EVERY MORNING
Qty: 30 CAPSULE | Refills: 0 | Status: SHIPPED | OUTPATIENT
Start: 2024-11-27
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
## 2024-12-04 DIAGNOSIS — F43.10 POST TRAUMATIC STRESS DISORDER (PTSD): ICD-10-CM

## 2024-12-04 DIAGNOSIS — F90.9 ATTENTION DEFICIT HYPERACTIVITY DISORDER (ADHD), UNSPECIFIED ADHD TYPE: ICD-10-CM

## 2024-12-04 DIAGNOSIS — F41.1 GAD (GENERALIZED ANXIETY DISORDER): ICD-10-CM

## 2024-12-04 RX ORDER — DEXTROAMPHETAMINE SACCHARATE, AMPHETAMINE ASPARTATE MONOHYDRATE, DEXTROAMPHETAMINE SULFATE AND AMPHETAMINE SULFATE 2.5; 2.5; 2.5; 2.5 MG/1; MG/1; MG/1; MG/1
10 CAPSULE, EXTENDED RELEASE ORAL EVERY MORNING
Qty: 30 CAPSULE | Refills: 0 | OUTPATIENT
Start: 2024-12-04

## 2024-12-04 RX ORDER — SERTRALINE HYDROCHLORIDE 100 MG/1
200 TABLET, FILM COATED ORAL NIGHTLY
Qty: 180 TABLET | Refills: 0 | OUTPATIENT
Start: 2024-12-04

## 2024-12-04 NOTE — TELEPHONE ENCOUNTER
REFILL REQUEST:     amphetamine-dextroamphetamine XR (ADDERALL XR) 10 MG 24 hr capsule (12/26/2024)    -ALREADY AT THE PHARMACY.    sertraline (ZOLOFT) 100 MG tablet (11/27/2024)     F/UP- NONE IN EPIC.  LOV: 11/27/2024.    LAST UDS:  Urine Drug Screen - Urine, Clean Catch (10/03/2024 09:36)     ROUTING TO COVERING PROVIDER.

## 2024-12-09 ENCOUNTER — TELEPHONE (OUTPATIENT)
Dept: PSYCHIATRY | Facility: CLINIC | Age: 34
End: 2024-12-09
Payer: COMMERCIAL

## 2025-01-24 DIAGNOSIS — F43.12 NIGHTMARES ASSOCIATED WITH CHRONIC POST-TRAUMATIC STRESS DISORDER: ICD-10-CM

## 2025-01-24 DIAGNOSIS — F51.5 NIGHTMARES ASSOCIATED WITH CHRONIC POST-TRAUMATIC STRESS DISORDER: ICD-10-CM

## 2025-01-24 NOTE — TELEPHONE ENCOUNTER
PT(PATIENT) VERIFIED     NEXT APPT WITH PROVIDER   Appointment with John Hernandez MD (2025)     PLEASE ADVISE

## 2025-01-24 NOTE — TELEPHONE ENCOUNTER
NEXT VISIT WITH PROVIDER   NONE IN [facility]     LAST SEEN BY PROVIDER   Telemedicine with John Hernandez MD (11/27/2024)     LAST MED REFILL  prazosin (MINIPRESS) 2 MG capsule (10/17/2024)

## 2025-01-27 RX ORDER — PRAZOSIN HYDROCHLORIDE 2 MG/1
2 CAPSULE ORAL NIGHTLY
Qty: 90 CAPSULE | Refills: 0 | Status: SHIPPED | OUTPATIENT
Start: 2025-01-27

## 2025-02-21 ENCOUNTER — TELEMEDICINE (OUTPATIENT)
Dept: PSYCHIATRY | Facility: CLINIC | Age: 35
End: 2025-02-21
Payer: COMMERCIAL

## 2025-02-21 DIAGNOSIS — Z03.89 NO DIAGNOSIS ON AXIS I: Primary | ICD-10-CM

## 2025-02-27 ENCOUNTER — OFFICE VISIT (OUTPATIENT)
Dept: NEUROLOGY | Facility: CLINIC | Age: 35
End: 2025-02-27
Payer: COMMERCIAL

## 2025-02-27 VITALS
BODY MASS INDEX: 26.25 KG/M2 | HEART RATE: 101 BPM | WEIGHT: 173.2 LBS | HEIGHT: 68 IN | DIASTOLIC BLOOD PRESSURE: 85 MMHG | SYSTOLIC BLOOD PRESSURE: 123 MMHG

## 2025-02-27 DIAGNOSIS — G43.719 INTRACTABLE CHRONIC MIGRAINE WITHOUT AURA AND WITHOUT STATUS MIGRAINOSUS: Primary | ICD-10-CM

## 2025-02-27 RX ORDER — ATOGEPANT 60 MG/1
60 TABLET ORAL DAILY
Qty: 30 TABLET | Refills: 5 | Status: SHIPPED | OUTPATIENT
Start: 2025-02-27

## 2025-02-27 RX ORDER — RIZATRIPTAN BENZOATE 10 MG/1
10 TABLET ORAL AS NEEDED
Qty: 9 TABLET | Refills: 5 | Status: CANCELLED | OUTPATIENT
Start: 2025-02-27

## 2025-02-27 NOTE — PROGRESS NOTES
"Chief Complaint  Migraine    Subjective          Cherie Soriano presents to [facility]  History of Present Illness    History of Present Illness  A 34-year-old female presents for follow-up for migraines. Over the past 2 months, there has been a worsening of symptoms, with daily occurrences. Severe episodes occur twice weekly, requiring rest. Additionally, there is a persistent sensation of \"brain fog,\" with difficulty in thought formation and speech, often leading to zoning out and a perceived dependency due to difficulty making autonomous decisions. Rizatriptan provides partial relief but does not completely alleviate symptoms. Currently on Qulipta for preventative therapy and rizatriptan as needed.        MEDICATIONS  CURRENT MEDS:  Qulipta  Rizatriptan As needed       Objective   Vital Signs:   /85   Pulse 101   Ht 172.7 cm (68\")   Wt 78.6 kg (173 lb 3.2 oz)   BMI 26.33 kg/m²     Physical Exam  HENT:      Head: Normocephalic.   Pulmonary:      Effort: Pulmonary effort is normal.   Neurological:      Mental Status: She is alert and oriented to person, place, and time.      Sensory: Sensation is intact.      Motor: Motor function is intact.      Coordination: Coordination is intact.      Deep Tendon Reflexes: Reflexes are normal and symmetric.      Neurological Exam  Mental Status  Alert. Oriented to person, place, and time.    Sensory  Normal sensation.    Reflexes  Deep tendon reflexes are 2+ and symmetric in all four extremities.    Coordination    Finger-to-nose, rapid alternating movements and heel-to-shin normal bilaterally without dysmetria.      Result Review :               Assessment and Plan    Diagnoses and all orders for this visit:    1. Intractable chronic migraine without aura and without status migrainosus (Primary)    Other orders  -     Atogepant (Qulipta) 60 MG tablet; Take 1 tablet by mouth Daily.  Dispense: 30 tablet; Refill: 5  -     " rimegepant sulfate ODT (Nurtec) 75 MG disintegrating tablet; Place 1 tablet under the tongue Daily As Needed (migraine).  Dispense: 8 tablet; Refill: 5        Assessment & Plan  1. Migraine.  The exacerbation of her migraines could potentially be attributed to increased stress levels. Her cognitive difficulties may be a manifestation of heightened anxiety and feelings of being overwhelmed. She is advised to monitor the frequency of over-the-counter medication use, such as Excedrin, Tylenol, or ibuprofen, as excessive use can precipitate daily headaches. A transition will be made from Maxalt to Nurtec, to be taken as 1 tablet daily as needed, pending insurance approval. The prescription for Qulipta will be renewed. If there is no improvement in her condition over the next few months, consideration will be given to modifying her preventative treatment.         Follow Up   Return in about 4 months (around 6/27/2025) for Migraine f/u.  Patient was given instructions and counseling regarding her condition or for health maintenance advice. Please see specific information pulled into the AVS if appropriate.       Patient or patient representative verbalized consent for the use of Ambient Listening during the visit with  SAUMYA Edmonds for chart documentation. 2/28/2025  13:37 EST

## 2025-02-28 ENCOUNTER — PRIOR AUTHORIZATION (OUTPATIENT)
Dept: NEUROLOGY | Facility: CLINIC | Age: 35
End: 2025-02-28
Payer: COMMERCIAL

## 2025-02-28 NOTE — TELEPHONE ENCOUNTER
PA for Nurtec submitted through [facility]  Approved today by [place] [employer] Med[employer] 2017  The request has been approved. The authorization is effective from 02/28/2025 to 05/28/2025, as long as the member is enrolled in their current health plan. A written notification letter will follow with additional details.  Effective Date: 2/27/2025  Authorization Expiration Date: 5/27/2025

## 2025-03-10 ENCOUNTER — TELEMEDICINE (OUTPATIENT)
Dept: PSYCHIATRY | Facility: CLINIC | Age: 35
End: 2025-03-10
Payer: COMMERCIAL

## 2025-03-10 DIAGNOSIS — F90.9 ATTENTION DEFICIT HYPERACTIVITY DISORDER (ADHD), UNSPECIFIED ADHD TYPE: Primary | ICD-10-CM

## 2025-03-10 DIAGNOSIS — F41.1 GAD (GENERALIZED ANXIETY DISORDER): ICD-10-CM

## 2025-03-10 DIAGNOSIS — F33.2 SEVERE EPISODE OF RECURRENT MAJOR DEPRESSIVE DISORDER, WITHOUT PSYCHOTIC FEATURES: ICD-10-CM

## 2025-03-10 DIAGNOSIS — F43.10 POST TRAUMATIC STRESS DISORDER (PTSD): ICD-10-CM

## 2025-03-10 PROCEDURE — 99214 OFFICE O/P EST MOD 30 MIN: CPT | Performed by: PSYCHIATRY & NEUROLOGY

## 2025-03-10 PROCEDURE — 1160F RVW MEDS BY RX/DR IN RCRD: CPT | Performed by: PSYCHIATRY & NEUROLOGY

## 2025-03-10 PROCEDURE — 1159F MED LIST DOCD IN RCRD: CPT | Performed by: PSYCHIATRY & NEUROLOGY

## 2025-03-10 RX ORDER — SERTRALINE HYDROCHLORIDE 100 MG/1
200 TABLET, FILM COATED ORAL NIGHTLY
Qty: 180 TABLET | Refills: 0 | Status: SHIPPED | OUTPATIENT
Start: 2025-03-10

## 2025-03-10 RX ORDER — ATOMOXETINE 25 MG/1
25 CAPSULE ORAL EVERY MORNING
Qty: 30 CAPSULE | Refills: 1 | Status: SHIPPED | OUTPATIENT
Start: 2025-03-10
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
## 2025-03-10 NOTE — TREATMENT PLAN
Multi-Disciplinary Problems (from Behavioral Health Treatment Plan)      Active Problems       Problem:  Patient Care Overview (Adult)  Start Date: 03/10/25      Problem Details: Treatment Planning for Cherie    Applicable diagnoses/problems:    - PTSD: reduce salience of trauma history with regard to its impact on thinking and behavior; work towards acceptance of that which has transpired not as acceptable, but has having happened; dampen emotional salience of trigger items via progressive desensitization; enhance motivation and interest with regard to ego-syntonic items of living via routine building and disruption of inhibitory executive cognitive circuits; challenge withdrawal from ego-syntonic items of living; cultivate smith and satisfaction via a consistent practice of appreciation.  - progress: partal, plateau  - frequency of intervention: 4-8 weeks as scheduling allows  - duration of treatment: until optimized functional status is met    - ADHD: practice behaviors that are conducive to creating functional routines (set a medication schedule, a sleep schedule, an activity schedule, limits on spending and other potential items of impulsivity); work towards optimizing a balance of utilizing compensatory mechanisms (including medications), accepting aid from applicable social communities/family, and validation of variable attention-stimulus traits in order to optimize daily functioning.  - progress: partial, plateau  - frequency of intervention: 4-8 weeks as scheduling allows  - duration of treatment: until optimized functional status is met    - Depression: enhance motivation and interest with regard to ego-syntonic items of living via routine building and disruption of inhibitory executive cognitive circuits; challenge withdrawal from ego-syntonic items of living; cultivate smith and satisfaction via a consistent practice of appreciation; consistently practice redirection from intrusive ego-dystonic thoughts  towards actionable items to reduce influence these thoughts have in emotions and behavior.  - progress: partial, plateau  - frequency of intervention: 4-8 weeks as scheduling allows  - duration of treatment: until optimized functional status is met    - Anxiety: enhance engagement with regard to ego-syntonic items of living via routine building and disruption of inhibitory executive cognitive circuits; challenge avoidance of ego-syntonic items of living via disruption to perceived barriers; reduce salience of fears and overwhelm with regard to their effects on thinking and behavior.  - progress: partial, plateau  - frequency of intervention: 4-8 weeks as scheduling allows  - duration of treatment: until optimized functional status is met        Goal Priority Start Date Expected End Date End Date    Plan of Care Review -- 03/10/25 09/08/25 --

## 2025-03-19 ENCOUNTER — TELEPHONE (OUTPATIENT)
Dept: PSYCHIATRY | Facility: CLINIC | Age: 35
End: 2025-03-19
Payer: COMMERCIAL

## 2025-03-19 NOTE — TELEPHONE ENCOUNTER
Pt says that she has developed a red, itchy, puffy rash on her face, arms, and hands.  Pt states that she went t urgent care and they gave her a steroid and told her to take her hydroxyzine.   Pt says that that just knocked her out.  Pt asked if it could be related to her atomoxetine

## 2025-03-19 NOTE — TELEPHONE ENCOUNTER
That would be unusual, but I can't say it's impossible. I'd recommend she discontinue the atomoxetine for at least the time being as a safety precaution to avoid worsening of the rash and to monitor for resolution.

## 2025-03-20 ENCOUNTER — OFFICE VISIT (OUTPATIENT)
Dept: FAMILY MEDICINE CLINIC | Facility: CLINIC | Age: 35
End: 2025-03-20
Payer: COMMERCIAL

## 2025-03-20 VITALS
SYSTOLIC BLOOD PRESSURE: 118 MMHG | WEIGHT: 172.2 LBS | HEIGHT: 68 IN | BODY MASS INDEX: 26.1 KG/M2 | OXYGEN SATURATION: 97 % | DIASTOLIC BLOOD PRESSURE: 82 MMHG | HEART RATE: 108 BPM | TEMPERATURE: 98.6 F

## 2025-03-20 DIAGNOSIS — L51.9 ERYTHEMA MULTIFORME: Primary | ICD-10-CM

## 2025-03-20 DIAGNOSIS — L30.9 DERMATITIS: ICD-10-CM

## 2025-03-20 LAB
ALBUMIN SERPL-MCNC: 4.4 G/DL (ref 3.5–5.2)
ALBUMIN/GLOB SERPL: 1.5 G/DL
ALP SERPL-CCNC: 90 U/L (ref 39–117)
ALT SERPL W P-5'-P-CCNC: 9 U/L (ref 1–33)
ANION GAP SERPL CALCULATED.3IONS-SCNC: 11 MMOL/L (ref 5–15)
AST SERPL-CCNC: 12 U/L (ref 1–32)
BASOPHILS # BLD AUTO: 0.06 10*3/MM3 (ref 0–0.2)
BASOPHILS NFR BLD AUTO: 0.6 % (ref 0–1.5)
BILIRUB SERPL-MCNC: 0.3 MG/DL (ref 0–1.2)
BUN SERPL-MCNC: 14 MG/DL (ref 6–20)
BUN/CREAT SERPL: 20 (ref 7–25)
CALCIUM SPEC-SCNC: 9.5 MG/DL (ref 8.6–10.5)
CHLORIDE SERPL-SCNC: 102 MMOL/L (ref 98–107)
CO2 SERPL-SCNC: 27 MMOL/L (ref 22–29)
CREAT SERPL-MCNC: 0.7 MG/DL (ref 0.57–1)
DEPRECATED RDW RBC AUTO: 43.8 FL (ref 37–54)
EGFRCR SERPLBLD CKD-EPI 2021: 117.3 ML/MIN/1.73
EOSINOPHIL # BLD AUTO: 0.35 10*3/MM3 (ref 0–0.4)
EOSINOPHIL NFR BLD AUTO: 3.4 % (ref 0.3–6.2)
ERYTHROCYTE [DISTWIDTH] IN BLOOD BY AUTOMATED COUNT: 13 % (ref 12.3–15.4)
GLOBULIN UR ELPH-MCNC: 2.9 GM/DL
GLUCOSE SERPL-MCNC: 89 MG/DL (ref 65–99)
HCT VFR BLD AUTO: 45.4 % (ref 34–46.6)
HGB BLD-MCNC: 14.2 G/DL (ref 12–15.9)
IMM GRANULOCYTES # BLD AUTO: 0.03 10*3/MM3 (ref 0–0.05)
IMM GRANULOCYTES NFR BLD AUTO: 0.3 % (ref 0–0.5)
LYMPHOCYTES # BLD AUTO: 2.22 10*3/MM3 (ref 0.7–3.1)
LYMPHOCYTES NFR BLD AUTO: 21.5 % (ref 19.6–45.3)
MCH RBC QN AUTO: 28.7 PG (ref 26.6–33)
MCHC RBC AUTO-ENTMCNC: 31.3 G/DL (ref 31.5–35.7)
MCV RBC AUTO: 91.7 FL (ref 79–97)
MONOCYTES # BLD AUTO: 0.47 10*3/MM3 (ref 0.1–0.9)
MONOCYTES NFR BLD AUTO: 4.5 % (ref 5–12)
NEUTROPHILS NFR BLD AUTO: 69.7 % (ref 42.7–76)
NEUTROPHILS NFR BLD AUTO: 7.2 10*3/MM3 (ref 1.7–7)
NRBC BLD AUTO-RTO: 0 /100 WBC (ref 0–0.2)
PLATELET # BLD AUTO: 292 10*3/MM3 (ref 140–450)
PMV BLD AUTO: 10.2 FL (ref 6–12)
POTASSIUM SERPL-SCNC: 4.1 MMOL/L (ref 3.5–5.2)
PROT SERPL-MCNC: 7.3 G/DL (ref 6–8.5)
RBC # BLD AUTO: 4.95 10*6/MM3 (ref 3.77–5.28)
SODIUM SERPL-SCNC: 140 MMOL/L (ref 136–145)
WBC NRBC COR # BLD AUTO: 10.33 10*3/MM3 (ref 3.4–10.8)

## 2025-03-20 PROCEDURE — 86038 ANTINUCLEAR ANTIBODIES: CPT | Performed by: NURSE PRACTITIONER

## 2025-03-20 PROCEDURE — 85025 COMPLETE CBC W/AUTO DIFF WBC: CPT | Performed by: NURSE PRACTITIONER

## 2025-03-20 PROCEDURE — 80053 COMPREHEN METABOLIC PANEL: CPT | Performed by: NURSE PRACTITIONER

## 2025-03-20 RX ORDER — TRIAMCINOLONE ACETONIDE 1 MG/G
1 OINTMENT TOPICAL 2 TIMES DAILY
Qty: 30 G | Refills: 0 | Status: SHIPPED | OUTPATIENT
Start: 2025-03-20

## 2025-03-20 RX ORDER — PERMETHRIN 50 MG/G
1 CREAM TOPICAL ONCE
Qty: 1 G | Refills: 0 | Status: SHIPPED | OUTPATIENT
Start: 2025-03-20 | End: 2025-03-20

## 2025-03-20 RX ORDER — VALACYCLOVIR HYDROCHLORIDE 1 G/1
1000 TABLET, FILM COATED ORAL 2 TIMES DAILY
Qty: 10 TABLET | Refills: 2 | Status: SHIPPED | OUTPATIENT
Start: 2025-03-20

## 2025-03-20 NOTE — PROGRESS NOTES
Chief Complaint  Rash (On hands and face since Sunday )    The PHQ has not been completed during this encounter.       Rash  This is a new problem. The current episode started in the past 7 days. The problem has been worsening since onset. The affected locations include the face, lips, left elbow, left hand, left wrist, left fingers, right elbow, right hand, right wrist and right fingers. The rash is characterized by blistering, burning, redness, swelling and itchiness. She was exposed to a new medication, emotional stress and other. Associated symptoms include congestion, diarrhea, facial edema, fatigue and a sore throat. Pertinent negatives include no anorexia, cough, fever, joint pain, nail changes, rhinorrhea, shortness of breath or vomiting.     Cherie Soriano is a 34 y.o. female who presents to [facility] with a past medical history of  Past Medical History:   Diagnosis Date    Allergic     Seasonal    Anxiety     Attention deficit hyperactivity disorder (ADHD) 10/03/2024    Benign essential hypertension     Blood clotting disorder     Cluster headache     Depression     Disease of thyroid gland     GERD (gastroesophageal reflux disease)     Headache     Headache, tension-type     History of medical problems     Hashimotos    Memory loss     Migraine     PTSD (post-traumatic stress disorder)     Rubella non-immune status, antepartum 12/04/2020    Seasonal allergies     Shoulder dystocia during labor and delivery 07/23/2021       HPI     The patient is a 34-year-old female who presents for evaluation of a rash on her hands.    She reports the onset of a rash on her hands, face, and elbows, which has now spread to her knees. The rash began on Sunday, with no known exposure to new substances or environments. She had recently started Strattera for ADHD, but her physician did not believe it was related to the rash. Regardless, the medication was discontinued until the rash resolves.  "The rash has progressively worsened over the week, despite starting prednisone 1 mg twice daily for 3 days on Monday. She describes the rash as intensely itchy, with a burning sensation upon touch, and a tingling sensation when lightly brushed. She also reports a recent cold sore outbreak and a history of fever blisters and occasional hand rashes resembling eczema. She has been documenting the rash's progression through daily photographs. She has not used Kenalog cream and reports no illness or rash on her abdomen or torso. She has been managing the itching with Zyrtec and hydroxyzine, which induces sleepiness. She reports no bone or joint pain but does report a mildly scratchy throat, which she attributes to the weather. She also reports no shortness of breath. She notes that the rash was less severe initially, with only a few small dots on her cheek and minimal involvement of her hands. However, the rash has since worsened, with significant swelling around her eye upon waking this morning. She reports no associated vision problems.    MEDICATIONS  Current: Prednisone, hydroxyzine, Zyrtec.  Discontinued: Strattera.       Objective   Vital Signs:   Vitals:    03/20/25 1306   BP: 118/82   BP Location: Left arm   Patient Position: Sitting   Pulse: 108   Temp: 98.6 °F (37 °C)   SpO2: 97%   Weight: 78.1 kg (172 lb 3.2 oz)   Height: 172.7 cm (68\")   PainSc: 0-No pain     Body mass index is 26.18 kg/m².    Wt Readings from Last 3 Encounters:   03/20/25 78.1 kg (172 lb 3.2 oz)   02/27/25 78.6 kg (173 lb 3.2 oz)   08/27/24 81.5 kg (179 lb 11.2 oz)     BP Readings from Last 3 Encounters:   03/20/25 118/82   02/27/25 123/85   08/27/24 116/75       Health Maintenance   Topic Date Due    ANNUAL PHYSICAL  Never done    PAP SMEAR  Never done    BMI FOLLOWUP  02/13/2024    COVID-19 Vaccine (1 - 2024-25 season) 03/31/2025 (Originally 9/1/2024)    INFLUENZA VACCINE  03/31/2025 (Originally 7/1/2024)    TDAP/TD VACCINES (5 - Td or " Tdap) 04/27/2031    HEPATITIS C SCREENING  Completed    Pneumococcal Vaccine 0-49  Aged Out       Physical Exam  Vitals reviewed.   Constitutional:       General: She is not in acute distress.     Appearance: Normal appearance. She is well-developed.   HENT:      Head: Normocephalic and atraumatic.   Eyes:      Conjunctiva/sclera: Conjunctivae normal.      Pupils: Pupils are equal, round, and reactive to light.   Musculoskeletal:      Right lower leg: No edema.      Left lower leg: No edema.   Skin:     General: Skin is warm and dry.          Neurological:      Mental Status: She is alert and oriented to person, place, and time.   Psychiatric:         Mood and Affect: Mood and affect normal.         Behavior: Behavior normal.         Thought Content: Thought content normal.         Judgment: Judgment normal.            Result Review :  Results           Procedures        Assessment and Plan   Diagnoses and all orders for this visit:    1. Erythema multiforme (Primary)  -     valACYclovir (Valtrex) 1000 MG tablet; Take 1 tablet by mouth 2 (Two) Times a Day.  Dispense: 10 tablet; Refill: 2    2. Dermatitis  -     CBC & Differential  -     Comprehensive Metabolic Panel  -     MEMO  -     permethrin (ELIMITE) 5 % cream; Apply 1 Application topically to the appropriate area as directed 1 (One) Time for 1 dose.  Dispense: 1 g; Refill: 0  -     triamcinolone (KENALOG) 0.1 % ointment; Apply 1 Application topically to the appropriate area as directed 2 (Two) Times a Day.  Dispense: 30 g; Refill: 0         1. Dermatological rash.  The etiology of the rash remains uncertain, but it does not appear to be a side effect of Strattera based on its distribution. The rash does not necessitate antibiotic treatment at this time and is not believed to be contagious. A comprehensive blood count and MEMO test will be conducted to rule out autoimmune conditions such as psoriasis. She is advised to continue her current steroid regimen. A  prescription for permethrin cream has been provided, which she is instructed to apply from the soles of her feet to her scalp, leave on overnight or for 8 to 14 hours, and then rinse off in the shower. Following this, she should apply triamcinolone cream to the affected areas. She may also take Zyrtec twice daily to alleviate itching. If Cherrie suggests an alternative treatment plan, it will be implemented accordingly.    Patient was called and notified that Shelley Méndez PA-C believes that the rash is Moreman erythema multiforme.  Will start patient on Valtrex.  Called patient and notified to not  or use permethrin but to start the Valtrex and may continue with the steroid oral and steroid cream.           FOLLOW UP  No follow-ups on file.    Patient was given instructions and counseling regarding her condition or for health maintenance advice. Please see specific information pulled into the AVS if appropriate.       Isela Doe, APRN  03/20/25  14:21 EDT    CURRENT & DISCONTINUED MEDICATIONS  Current Outpatient Medications   Medication Instructions    hydrOXYzine (ATARAX) 25 mg, Oral, 3 Times Daily PRN    Levonorgestrel (Mirena, 52 MG,) 20 MCG/DAY intrauterine device IUD 1 each    ondansetron ODT (ZOFRAN-ODT) 4 mg, Translingual, Every 8 Hours PRN    permethrin (ELIMITE) 5 % cream 1 Application, Topical, Once    prazosin (MINIPRESS) 2 mg, Oral, Nightly    Qulipta 60 mg, Oral, Daily    rimegepant sulfate ODT (NURTEC) 75 mg, Sublingual, Daily PRN    sertraline (ZOLOFT) 200 mg, Oral, Nightly    triamcinolone (KENALOG) 0.1 % ointment 1 Application, Topical, 2 Times Daily    valACYclovir (VALTREX) 1,000 mg, Oral, 2 Times Daily       Medications Discontinued During This Encounter   Medication Reason    atomoxetine (Strattera) 25 MG capsule *Therapy completed    triamcinolone (KENALOG) 0.1 % ointment Reorder        Patient or patient representative verbalized consent for the use of Ambient Listening during  the visit with  SAUMYA Schneider for chart documentation. 3/20/2025  13:23 EDT

## 2025-03-20 NOTE — PROGRESS NOTES
..  Venipuncture Blood Specimen Collection  Venipuncture performed in left arm by Susana Sanchez with good hemostasis. Patient tolerated the procedure well without complications.   03/20/25   Susana Sanchez

## 2025-03-21 LAB — ANA SER QL: NEGATIVE

## 2025-04-24 ENCOUNTER — TELEMEDICINE (OUTPATIENT)
Dept: PSYCHIATRY | Facility: CLINIC | Age: 35
End: 2025-04-24
Payer: COMMERCIAL

## 2025-04-24 DIAGNOSIS — F41.1 GAD (GENERALIZED ANXIETY DISORDER): ICD-10-CM

## 2025-04-24 DIAGNOSIS — F33.1 MAJOR DEPRESSIVE DISORDER, RECURRENT EPISODE, MODERATE: ICD-10-CM

## 2025-04-24 DIAGNOSIS — F90.9 ATTENTION DEFICIT HYPERACTIVITY DISORDER (ADHD), UNSPECIFIED ADHD TYPE: Primary | ICD-10-CM

## 2025-04-24 DIAGNOSIS — F43.10 POST TRAUMATIC STRESS DISORDER (PTSD): ICD-10-CM

## 2025-04-24 RX ORDER — ATOMOXETINE 25 MG/1
25 CAPSULE ORAL EVERY MORNING
COMMUNITY
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
## 2025-04-30 ENCOUNTER — PATIENT MESSAGE (OUTPATIENT)
Dept: FAMILY MEDICINE CLINIC | Facility: CLINIC | Age: 35
End: 2025-04-30
Payer: COMMERCIAL

## 2025-04-30 ENCOUNTER — PRIOR AUTHORIZATION (OUTPATIENT)
Dept: NEUROLOGY | Facility: CLINIC | Age: 35
End: 2025-04-30
Payer: COMMERCIAL

## 2025-04-30 NOTE — TELEPHONE ENCOUNTER
The request has been approved. The authorization is effective from 04/30/2025 to 04/30/2026, as long as the member is enrolled in their current health plan. A written notification letter will follow with additional details.  Effective Date: 4/30/2025  Authorization Expiration Date: 4/30/2026

## 2025-05-05 ENCOUNTER — OFFICE VISIT (OUTPATIENT)
Dept: FAMILY MEDICINE CLINIC | Facility: CLINIC | Age: 35
End: 2025-05-05
Payer: COMMERCIAL

## 2025-05-05 VITALS
DIASTOLIC BLOOD PRESSURE: 82 MMHG | BODY MASS INDEX: 26.82 KG/M2 | SYSTOLIC BLOOD PRESSURE: 118 MMHG | WEIGHT: 176.4 LBS | TEMPERATURE: 98 F | HEART RATE: 70 BPM | OXYGEN SATURATION: 99 %

## 2025-05-05 DIAGNOSIS — B00.1 FEVER BLISTER: ICD-10-CM

## 2025-05-05 DIAGNOSIS — Z86.2 HISTORY OF ANEMIA: ICD-10-CM

## 2025-05-05 DIAGNOSIS — R53.83 OTHER FATIGUE: ICD-10-CM
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
## 2025-05-05 DIAGNOSIS — L30.9 DERMATITIS: ICD-10-CM

## 2025-05-05 DIAGNOSIS — S40.861A TICK BITE OF RIGHT UPPER ARM, INITIAL ENCOUNTER: Primary | ICD-10-CM

## 2025-05-05 DIAGNOSIS — R21 RASH: ICD-10-CM

## 2025-05-05 LAB
FERRITIN SERPL-MCNC: 84.1 NG/ML (ref 13–150)
IRON 24H UR-MRATE: 50 MCG/DL (ref 37–145)
IRON SATN MFR SERPL: 15 % (ref 20–50)
TIBC SERPL-MCNC: 338 MCG/DL (ref 298–536)
TRANSFERRIN SERPL-MCNC: 227 MG/DL (ref 200–360)

## 2025-05-05 PROCEDURE — 86695 HERPES SIMPLEX TYPE 1 TEST: CPT | Performed by: NURSE PRACTITIONER

## 2025-05-05 PROCEDURE — 82728 ASSAY OF FERRITIN: CPT | Performed by: NURSE PRACTITIONER

## 2025-05-05 PROCEDURE — 86757 RICKETTSIA ANTIBODY: CPT | Performed by: NURSE PRACTITIONER

## 2025-05-05 PROCEDURE — 86618 LYME DISEASE ANTIBODY: CPT | Performed by: NURSE PRACTITIONER

## 2025-05-05 PROCEDURE — 86003 ALLG SPEC IGE CRUDE XTRC EA: CPT | Performed by: NURSE PRACTITIONER

## 2025-05-05 PROCEDURE — 86008 ALLG SPEC IGE RECOMB EA: CPT | Performed by: NURSE PRACTITIONER

## 2025-05-05 PROCEDURE — 82785 ASSAY OF IGE: CPT | Performed by: NURSE PRACTITIONER

## 2025-05-05 PROCEDURE — 84466 ASSAY OF TRANSFERRIN: CPT | Performed by: NURSE PRACTITIONER

## 2025-05-05 PROCEDURE — 83540 ASSAY OF IRON: CPT | Performed by: NURSE PRACTITIONER

## 2025-05-05 PROCEDURE — 86696 HERPES SIMPLEX TYPE 2 TEST: CPT | Performed by: NURSE PRACTITIONER

## 2025-05-05 RX ORDER — PREDNISONE 5 MG/1
TABLET ORAL
Qty: 1 EACH | Refills: 0 | Status: SHIPPED | OUTPATIENT
Start: 2025-05-05

## 2025-05-05 RX ORDER — VALACYCLOVIR HYDROCHLORIDE 500 MG/1
500 TABLET, FILM COATED ORAL DAILY
Qty: 90 TABLET | Refills: 1 | Status: SHIPPED | OUTPATIENT
Start: 2025-05-05

## 2025-05-05 RX ORDER — CETIRIZINE HYDROCHLORIDE 10 MG/1
10 TABLET ORAL 2 TIMES DAILY
Qty: 28 TABLET | Refills: 0 | Status: SHIPPED | OUTPATIENT
Start: 2025-05-05

## 2025-05-05 NOTE — PROGRESS NOTES
Chief Complaint  Rash (Has rash that started from her arm and made its way to her neck and chest area. She states that it itches and burns when touched)    Little interest or pleasure in doing things? More than half the days   Feeling down, depressed, or hopeless? Not at all   PHQ-2 Total Score 2   Trouble falling or staying asleep, or sleeping too much? Nearly every day   Feeling tired or having little energy? Nearly every day   Poor appetite or overeating? Nearly every day   Feeling bad about yourself - or that you are a failure or have let yourself or your family down? Not at all   Trouble concentrating on things, such as reading the newspaper or watching television? Nearly every day   Moving or speaking so slowly that other people could have noticed? Or the opposite - being so fidgety or restless that you have been moving around a lot more than usual? Several days     Thoughts that you would be better off dead, or of hurting yourself in some way? Not at all   PHQ-9 Total Score 15   If you checked off any problems, how difficult have these problems made it for you to do your work, take care of things at home, or get along with other people? Very difficult          History of Present Illness  Cherie Soriano is a 34 y.o. female who presents to [facility] with a past medical history of  Past Medical History:   Diagnosis Date    Allergic     Seasonal    Anxiety     Attention deficit hyperactivity disorder (ADHD) 10/03/2024    Benign essential hypertension     Blood clotting disorder     Cluster headache     Depression     Disease of thyroid gland     GERD (gastroesophageal reflux disease)     Headache     Headache, tension-type     History of medical problems     Hashimotos    Memory loss     Migraine     PTSD (post-traumatic stress disorder)     Rubella non-immune status, antepartum 12/04/2020    Seasonal allergies     Shoulder dystocia during labor and delivery 07/23/2021        HPI     The patient is a 34-year-old female who presents for evaluation of a rash on her neck and arms.    She reports a rash extending from her neck to both arms, which she describes as similar to a previous rash on her hands and face. The current rash is less severe than the previous one, which was attributed to stress. She has not been exposed to potential allergens such as poison ivy or grass and has not been outdoors recently. She recalls a tick bite prior to the onset of the rash. The rash is itchy and burns upon contact, with occasional tingling sensations, particularly when irritated. The itching intensifies with heat or perspiration. She does not report any associated fever, chills, or body aches. She also does not report any joint pain. She has not observed any correlation between the rash and her diet. She has been using hydroxyzine for the itching, but it induces drowsiness.    She has a history of cold sores and fever blisters, which have become more frequent recently. Previously, she would experience these symptoms once or twice a year, often coinciding with the start of the school year or periods of stress. Currently, she reports that new blisters appear within a week of resolving the previous ones.    She is under the care of Dr. Valdes for thyroid management. She experiences constant coldness, which she attributes to her thyroid condition.    She has a history of anemia and reports persistent fatigue, even after adequate sleep.       Objective   Vital Signs:   Vitals:    05/05/25 1536   BP: 118/82   BP Location: Right arm   Patient Position: Sitting   Cuff Size: Adult   Pulse: 70   Temp: 98 °F (36.7 °C)   TempSrc: Infrared   SpO2: 99%   Weight: 80 kg (176 lb 6.4 oz)     Body mass index is 26.82 kg/m².    Wt Readings from Last 3 Encounters:   05/05/25 80 kg (176 lb 6.4 oz)   03/20/25 78.1 kg (172 lb 3.2 oz)   02/27/25 78.6 kg (173 lb 3.2 oz)     BP Readings from Last 3 Encounters:   05/05/25  118/82   03/20/25 118/82   02/27/25 123/85       Health Maintenance   Topic Date Due    PAP SMEAR  Never done    ANNUAL PHYSICAL  Never done    COVID-19 Vaccine (1 - 2024-25 season) Never done    INFLUENZA VACCINE  07/01/2025    TDAP/TD VACCINES (5 - Td or Tdap) 04/27/2031    HEPATITIS C SCREENING  Completed    Pneumococcal Vaccine 0-49  Aged Out       Physical Exam  Vitals reviewed.   Constitutional:       General: She is not in acute distress.     Appearance: Normal appearance. She is well-developed.   HENT:      Head: Normocephalic and atraumatic.      Right Ear: External ear normal.      Left Ear: External ear normal.   Eyes:      Conjunctiva/sclera: Conjunctivae normal.      Pupils: Pupils are equal, round, and reactive to light.   Cardiovascular:      Rate and Rhythm: Normal rate and regular rhythm.      Heart sounds: Normal heart sounds.   Pulmonary:      Effort: Pulmonary effort is normal.      Breath sounds: Normal breath sounds.   Musculoskeletal:      Cervical back: Neck supple.      Right lower leg: No edema.      Left lower leg: No edema.   Skin:     General: Skin is warm and dry.      Findings: Rash present.      Comments: The scattered areas of urticarial/lacy erythematous and slightly raised rash to the neck and bilateral arms.   Neurological:      Mental Status: She is alert and oriented to person, place, and time.   Psychiatric:         Mood and Affect: Mood and affect normal.         Behavior: Behavior normal.         Thought Content: Thought content normal.         Judgment: Judgment normal.            Result Review :  The following data was reviewed by: SAUMYA Schneider on 05/05/2025:  Results  Labs   - MEMO: 03/20/2025, Normal   - Blood sugar: 03/20/2025, Normal   - Kidney function: 03/20/2025, Normal   - Liver function: 03/20/2025, Normal   - Blood counts: 03/20/2025, Normal     Common labs          3/20/2025    13:43   Common Labs   Glucose 89    BUN 14    Creatinine 0.70    Sodium 140     Potassium 4.1    Chloride 102    Calcium 9.5    Albumin 4.4    Total Bilirubin 0.3    Alkaline Phosphatase 90    AST (SGOT) 12    ALT (SGPT) 9    WBC 10.33    Hemoglobin 14.2    Hematocrit 45.4    Platelets 292          Procedures        Assessment and Plan   Diagnoses and all orders for this visit:    1. Tick bite of right upper arm, initial encounter (Primary)  -     Lyme Disease Total Antibody With Reflex to Immunoassay  -     Spotted Fever Group AB, IgG/IgM    2. Dermatitis  -     HSV 1 & 2 - Specific Antibody, IgG  -     Alpha-Gal IgE Panel  -     predniSONE 5 MG (21) tablet therapy pack dose pack; Take as directed on package instructions.  Dispense: 1 each; Refill: 0  -     cetirizine (zyrTEC) 10 MG tablet; Take 1 tablet by mouth 2 (Two) Times a Day.  Dispense: 28 tablet; Refill: 0    3. Other fatigue    4. History of anemia  -     Iron Profile  -     Ferritin    5. Fever blister  -     valACYclovir (Valtrex) 500 MG tablet; Take 1 tablet by mouth Daily. Increase to 2 tablets (1000mg) twice daily with an outbreak  Dispense: 90 tablet; Refill: 1    6. Rash         1. Rash.  - The rash appears to be either an allergic or contact dermatitis.  - Physical examination reveals scattered rash on both arms extending into the left hand and left elbow area, with itching and burning sensations.  - Laboratory tests for Lyme disease, [place] spotted fever, and alpha-gal will be ordered. MEMO was checked on 03/20/2025 and was normal, ruling out lupus.  - A steroid Dosepak will be prescribed to manage the inflammation and itching. Valtrex 500 mg daily will be initiated as a preventative measure against fever blisters, with instructions to increase the dosage to 1000 mg twice daily if an outbreak occurs. Zyrtec will be prescribed for short-term use, up to twice daily for 2 weeks, to alleviate itching.    2. Fatigue.  - Reports being tired all the time, which has worsened over the last week.  - No joint pain or fever,  chills, body aches reported.  - Blood counts will be checked to rule out anemia as a cause of her fatigue.  - History of anemia noted.    3. Thyroid disorder.  - Currently seeing Dr. Gomes for thyroid issues.  - No changes in thyroid management are planned at this time.  - Reports always feeling cold, possibly related to thyroid issues.    4. Cold sores and fever blisters.  - Reports frequent cold sores and fever blisters, which have increased recently.  - Valtrex 500 mg daily will be initiated as a preventative measure. If an outbreak occurs while on the preventative regimen, the dosage can be increased to 1000 mg twice daily.  - Prescription Drug Monitoring Program was reviewed.              FOLLOW UP  Return if symptoms worsen or fail to improve.    Patient was given instructions and counseling regarding her condition or for health maintenance advice. Please see specific information pulled into the AVS if appropriate.       Isela Doe, SAUMYA  05/06/25  08:06 EDT    CURRENT & DISCONTINUED MEDICATIONS  Current Outpatient Medications   Medication Instructions    atomoxetine (STRATTERA) 25 mg, Every Morning    cetirizine (ZYRTEC) 10 mg, Oral, 2 Times Daily    hydrOXYzine (ATARAX) 25 mg, Oral, 3 Times Daily PRN    Levonorgestrel (Mirena, 52 MG,) 20 MCG/DAY intrauterine device IUD 1 each    ondansetron ODT (ZOFRAN-ODT) 4 mg, Translingual, Every 8 Hours PRN    predniSONE 5 MG (21) tablet therapy pack dose pack Take as directed on package instructions.    Qulipta 60 mg, Oral, Daily    rimegepant sulfate ODT (NURTEC) 75 mg, Sublingual, Daily PRN    sertraline (ZOLOFT) 200 mg, Oral, Nightly    triamcinolone (KENALOG) 0.1 % ointment 1 Application, Topical, 2 Times Daily    valACYclovir (VALTREX) 500 mg, Oral, Daily, Increase to 2 tablets (1000mg) twice daily with an outbreak       Medications Discontinued During This Encounter   Medication Reason    valACYclovir (Valtrex) 1000 MG tablet         Patient or patient  representative verbalized consent for the use of Ambient Listening during the visit with  SAUMYA Schneider for chart documentation. 5/6/2025  15:54 EDT

## 2025-05-05 NOTE — PROGRESS NOTES
Venipuncture Blood Specimen Collection  Venipuncture performed in left arm by Terrance Hardin MA with good hemostasis. Patient tolerated the procedure well without complications.   05/05/25   Terrance Hardin MA

## 2025-05-06 ENCOUNTER — RESULTS FOLLOW-UP (OUTPATIENT)
Dept: FAMILY MEDICINE CLINIC | Facility: CLINIC | Age: 35
End: 2025-05-06
Payer: COMMERCIAL

## 2025-05-06 LAB
B BURGDOR IGG+IGM SER QL IA: NEGATIVE
HSV1 IGG SERPL QL IA: REACTIVE
HSV2 IGG SERPL QL IA: NON REACTIVE
RESULT COMMENT:: NORMAL
RICK SF IGG TITR SER: NORMAL {TITER}
RICK SF IGM TITR SER: NORMAL {TITER}

## 2025-05-07 LAB
ALPHA-GAL IGE QN: 1.91 KU/L
BEEF IGE QN: 1.15 KU/L
CONV CLASS DESCRIPTION: ABNORMAL
IGE SERPL-ACNC: 67 IU/ML (ref 6–495)
LAMB IGE QN: 0.27 KU/L
PORK IGE QN: 0.5 KU/L

## 2025-05-19 ENCOUNTER — PRIOR AUTHORIZATION (OUTPATIENT)
Dept: NEUROLOGY | Facility: CLINIC | Age: 35
End: 2025-05-19
Payer: COMMERCIAL

## 2025-05-19 NOTE — TELEPHONE ENCOUNTER
Approved today by [place] [employer] 2017  The request has been approved. The authorization is effective from 05/19/2025 to 05/19/2026, as long as the member is enrolled in their current health plan. A written notification letter will follow with additional details.  Effective Date: 5/19/2025  Authorization Expiration Date: 5/19/2026  
Nurtec PA submitted via CM  
normal...

## 2025-05-20 RX ORDER — ATOMOXETINE 25 MG/1
25 CAPSULE ORAL EVERY MORNING
Qty: 30 CAPSULE | Refills: 0 | Status: SHIPPED | OUTPATIENT
Start: 2025-05-20

## 2025-05-20 NOTE — TELEPHONE ENCOUNTER
ATOMOXETINE 25MG CAPSULES   Last ordered: 3 weeks ago (4/24/2025) by Historical Provider, MD   FOLLOW UP 06/05/2025

## 2025-06-15 DIAGNOSIS — F41.1 GAD (GENERALIZED ANXIETY DISORDER): ICD-10-CM

## 2025-06-15 DIAGNOSIS — F43.10 POST TRAUMATIC STRESS DISORDER (PTSD): ICD-10-CM

## 2025-06-16 RX ORDER — SERTRALINE HYDROCHLORIDE 100 MG/1
200 TABLET, FILM COATED ORAL NIGHTLY
Qty: 180 TABLET | Refills: 0 | Status: SHIPPED | OUTPATIENT
Start: 2025-06-16

## 2025-06-16 NOTE — TELEPHONE ENCOUNTER
NEXT VISIT WITH PROVIDER   Appointment with John Hernandez MD (07/09/2025)     LAST SEEN BY PROVIDER   Telemedicine with John Hernandez MD (04/24/2025)     LAST MED REFILL  sertraline (ZOLOFT) 100 MG tablet (03/10/2025)     PROVIDER PLEASE ADVISE

## 2025-07-01 ENCOUNTER — PRIOR AUTHORIZATION (OUTPATIENT)
Dept: NEUROLOGY | Facility: CLINIC | Age: 35
End: 2025-07-01

## 2025-07-01 ENCOUNTER — OFFICE VISIT (OUTPATIENT)
Dept: NEUROLOGY | Facility: CLINIC | Age: 35
End: 2025-07-01
Payer: COMMERCIAL

## 2025-07-01 VITALS
HEART RATE: 66 BPM | HEIGHT: 68 IN | BODY MASS INDEX: 27.61 KG/M2 | SYSTOLIC BLOOD PRESSURE: 122 MMHG | WEIGHT: 182.2 LBS | DIASTOLIC BLOOD PRESSURE: 85 MMHG

## 2025-07-01 DIAGNOSIS — G43.719 INTRACTABLE CHRONIC MIGRAINE WITHOUT AURA AND WITHOUT STATUS MIGRAINOSUS: Primary | ICD-10-CM

## 2025-07-01 DIAGNOSIS — R11.0 NAUSEA: ICD-10-CM

## 2025-07-01 DIAGNOSIS — Z72.0 TOBACCO USE: ICD-10-CM

## 2025-07-01 RX ORDER — ONDANSETRON 4 MG/1
4 TABLET, ORALLY DISINTEGRATING ORAL EVERY 8 HOURS PRN
Qty: 20 TABLET | Refills: 5 | Status: SHIPPED | OUTPATIENT
Start: 2025-07-01

## 2025-07-01 RX ORDER — ATOGEPANT 60 MG/1
60 TABLET ORAL DAILY
Qty: 30 TABLET | Refills: 5 | Status: SHIPPED | OUTPATIENT
Start: 2025-07-01

## 2025-07-01 NOTE — PROGRESS NOTES
"Chief Complaint  Migraine    Subjective          Cherie Soriano presents to [facility]  History of Present Illness    History of Present Illness  The patient is a 34-year-old female who presents to the office for a follow-up on migraines. She currently remains on Qulipta preventatively and Nurtec as needed.    She reports experiencing severe headaches, with the past week being particularly challenging. Despite being on Qulipta, she experiences headaches on approximately half of the days each month. She suspects that weather conditions such as humidity, rain, and heat may be contributing factors. She has attempted to alleviate her symptoms by increasing her water intake, but this has not provided relief. She finds Nurtec to be effective when used as needed.    She also mentions that she had a severe headache yesterday, which led her to try a combination of Nurtec, Benadryl, and Zofran, similar to the treatment her daughter's neurologist prescribed for her. This resulted in her sleeping for most of the day.    INTERVAL: Since last visit, she reports experiencing headaches on approximately half of the days each month despite being on Qulipta preventatively. She has also tried increasing her water intake to alleviate symptoms without success.    MEDICATIONS  CURRENT MEDS:  Qulipta Preventatively  Nurtec As needed  Zofran  PREVIOUS MEDS:  Aimovig  Naproxen  Reason for Discontinuation: Makes head burn       Objective   Vital Signs:   /85 (BP Location: Right arm, Patient Position: Sitting, Cuff Size: Adult)   Pulse 66   Ht 172.7 cm (68\")   Wt 82.6 kg (182 lb 3.2 oz)   BMI 27.70 kg/m²     Physical Exam  HENT:      Head: Normocephalic.   Pulmonary:      Effort: Pulmonary effort is normal.   Neurological:      Mental Status: She is alert and oriented to person, place, and time.      Sensory: Sensation is intact.      Motor: Motor function is intact.      Coordination: " Coordination is intact.      Deep Tendon Reflexes: Reflexes are normal and symmetric.        Neurological Exam  Mental Status  Alert. Oriented to person, place, and time.    Sensory  Normal sensation.    Reflexes  Deep tendon reflexes are 2+ and symmetric in all four extremities.    Coordination    Finger-to-nose, rapid alternating movements and heel-to-shin normal bilaterally without dysmetria.      Result Review :               Assessment and Plan    Diagnoses and all orders for this visit:    1. Intractable chronic migraine without aura and without status migrainosus (Primary)  -     ondansetron ODT (ZOFRAN-ODT) 4 MG disintegrating tablet; Place 1 tablet on the tongue Every 8 (Eight) Hours As Needed for Nausea.  Dispense: 20 tablet; Refill: 5    2. Nausea  -     ondansetron ODT (ZOFRAN-ODT) 4 MG disintegrating tablet; Place 1 tablet on the tongue Every 8 (Eight) Hours As Needed for Nausea.  Dispense: 20 tablet; Refill: 5    3. Tobacco use    Other orders  -     Atogepant (Qulipta) 60 MG tablet; Take 1 tablet by mouth Daily.  Dispense: 30 tablet; Refill: 5  -     rimegepant sulfate ODT (Nurtec) 75 MG disintegrating tablet; Place 1 tablet under the tongue Daily As Needed (migraine).  Dispense: 8 tablet; Refill: 5        Assessment & Plan  1. Migraine.  She reports experiencing headaches on approximately half of the days each month despite being on Qulipta. The addition of Botox injections was discussed as a preventative measure to better control her migraines. The mechanism of action, potential side effects, and benefits of Botox were thoroughly explained. It was noted that Botox might take 2-3 rounds to reach full efficacy and is administered once every 12 weeks. The biggest risk of muscle weakness was discussed. She will continue with Qulipta and Nurtec as needed. A prescription refill for Zofran has been provided. Literature on Botox was given for further reading. Authorization for Botox injections will be  sought from her insurance. Once approved, she will be scheduled for the procedure.    Follow-up  A follow-up appointment is scheduled for 8 weeks from now, either via telemedicine or in-office, to assess her response to the treatment.         Follow Up   No follow-ups on file.  Patient was given instructions and counseling regarding her condition or for health maintenance advice. Please see specific information pulled into the AVS if appropriate.       Patient or patient representative verbalized consent for the use of Ambient Listening during the visit with  SAUMYA Edmonds for chart documentation. 7/2/2025  11:12 EDT

## 2025-07-07 RX ORDER — ATOMOXETINE 25 MG/1
25 CAPSULE ORAL EVERY MORNING
Qty: 30 CAPSULE | Refills: 0 | Status: SHIPPED | OUTPATIENT
Start: 2025-07-07 | End: 2025-07-10

## 2025-07-07 NOTE — TELEPHONE ENCOUNTER
NEXT VISIT WITH PROVIDER   Appointment with John Hernandez MD (07/09/2025)     LAST SEEN BY PROVIDER   Telemedicine with John Hernandez MD (04/24/2025)     LAST MED REFILL  atomoxetine (STRATTERA) 25 MG capsule (05/20/2025)     PROVIDER PLEASE ADVISE

## 2025-07-10 ENCOUNTER — TELEMEDICINE (OUTPATIENT)
Dept: PSYCHIATRY | Facility: CLINIC | Age: 35
End: 2025-07-10
Payer: COMMERCIAL

## 2025-07-10 DIAGNOSIS — F43.10 POST TRAUMATIC STRESS DISORDER (PTSD): ICD-10-CM

## 2025-07-10 DIAGNOSIS — F33.2 SEVERE EPISODE OF RECURRENT MAJOR DEPRESSIVE DISORDER, WITHOUT PSYCHOTIC FEATURES: ICD-10-CM

## 2025-07-10 DIAGNOSIS — F90.9 ATTENTION DEFICIT HYPERACTIVITY DISORDER (ADHD), UNSPECIFIED ADHD TYPE: Primary | ICD-10-CM

## 2025-07-10 DIAGNOSIS — F41.1 GAD (GENERALIZED ANXIETY DISORDER): ICD-10-CM

## 2025-07-10 RX ORDER — BUPROPION HYDROCHLORIDE 150 MG/1
150 TABLET ORAL EVERY MORNING
Qty: 30 TABLET | Refills: 1 | Status: SHIPPED | OUTPATIENT
Start: 2025-07-10

## 2025-07-10 RX ORDER — CLONIDINE HYDROCHLORIDE 0.1 MG/1
0.1 TABLET ORAL 2 TIMES DAILY PRN
Qty: 180 TABLET | Refills: 0 | Status: SHIPPED | OUTPATIENT
Start: 2025-07-10
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
## 2025-07-22 ENCOUNTER — TELEPHONE (OUTPATIENT)
Dept: NEUROLOGY | Facility: CLINIC | Age: 35
End: 2025-07-22

## 2025-07-22 ENCOUNTER — OFFICE VISIT (OUTPATIENT)
Dept: FAMILY MEDICINE CLINIC | Facility: CLINIC | Age: 35
End: 2025-07-22
Payer: COMMERCIAL

## 2025-07-22 VITALS
BODY MASS INDEX: 28.16 KG/M2 | HEART RATE: 79 BPM | TEMPERATURE: 97.8 F | SYSTOLIC BLOOD PRESSURE: 112 MMHG | DIASTOLIC BLOOD PRESSURE: 78 MMHG | OXYGEN SATURATION: 99 % | WEIGHT: 185.2 LBS

## 2025-07-22 DIAGNOSIS — G56.11 RIGHT MEDIAN NERVE NEUROPATHY: Primary | ICD-10-CM

## 2025-07-22 PROCEDURE — 1126F AMNT PAIN NOTED NONE PRSNT: CPT | Performed by: FAMILY MEDICINE

## 2025-07-22 PROCEDURE — 3078F DIAST BP <80 MM HG: CPT | Performed by: FAMILY MEDICINE

## 2025-07-22 PROCEDURE — 3074F SYST BP LT 130 MM HG: CPT | Performed by: FAMILY MEDICINE

## 2025-07-22 PROCEDURE — 99213 OFFICE O/P EST LOW 20 MIN: CPT | Performed by: FAMILY MEDICINE

## 2025-07-22 RX ORDER — CELECOXIB 100 MG/1
100 CAPSULE ORAL 2 TIMES DAILY PRN
Qty: 28 CAPSULE | Refills: 0 | Status: SHIPPED | OUTPATIENT
Start: 2025-07-22 | End: 2025-08-05

## 2025-07-22 NOTE — PROGRESS NOTES
Chief Complaint    Hand Pain (Pt is having pain in her right hand. She says its mostly in her thumb area. She is wearing a brace)    Subjective      Cherie Soriano presents to [facility]    1.) RIGHT HAND PAIN : Onset - > 1 week. Right hand affected. Patient presents with complaints of pain of her thenar eminence + thumb. She reports a decrease in her  strength. She is also reporting difficulty with grasping/pincer movements. She notes an intermittent burning pain, as well. Digits affected - 1 to 3. Patient has been using a soft brace, which has not provided significant improvement. Mild improvement with OTC NSAID.     Objective     Vital Signs:     /78 (BP Location: Right arm, Patient Position: Sitting, Cuff Size: Adult)   Pulse 79   Temp 97.8 °F (36.6 °C) (Infrared)   Wt 84 kg (185 lb 3.2 oz)   SpO2 99%   BMI 28.16 kg/m²       Physical Exam  Vitals reviewed.   Constitutional:       General: She is not in acute distress.     Appearance: Normal appearance. She is well-developed.   HENT:      Head: Normocephalic and atraumatic.      Right Ear: Hearing and external ear normal.      Left Ear: Hearing and external ear normal.      Nose: Nose normal.   Eyes:      General: Lids are normal.         Right eye: No discharge.         Left eye: No discharge.      Conjunctiva/sclera: Conjunctivae normal.   Pulmonary:      Effort: Pulmonary effort is normal.   Abdominal:      General: There is no distension.   Musculoskeletal:         General: No swelling.      Cervical back: Neck supple.      Comments: Examination of right hand : no significant tenderness appreciated with palpation of region of flexor retinaculum. Decreased  strength appreciated on the right. Some discomfort appreciated with Phalen test.   Skin:     Coloration: Skin is not jaundiced.      Findings: No erythema.   Neurological:      Mental Status: She is alert. Mental status is at baseline.   Psychiatric:          Mood and Affect: Mood and affect normal.         Thought Content: Thought content normal.     Assessment and Plan     Diagnoses and all orders for this visit:    1. Right median nerve neuropathy (Primary)    Other orders  -     Misc. Devices (Wrist Brace) misc; Use 1 each Every Night for 14 days.  Dispense: 1 each; Refill: 0  -     celecoxib (CeleBREX) 100 MG capsule; Take 1 capsule by mouth 2 (Two) Times a Day As Needed for Mild Pain for up to 14 days.  Dispense: 28 capsule; Refill: 0    Suspect median nerve neuropathy. Recommendation for 2 week trial of nightime bracing. Also recommend 2 week trial of PRN Celebrex. Advised that if no relief, we will consider a referral to a hand specialist.     Follow Up     Return in about 2 weeks (around 8/5/2025).    Patient was given instructions and counseling regarding her condition or for health maintenance advice. Please see specific information pulled into the AVS if appropriate.

## 2025-07-24 ENCOUNTER — PATIENT MESSAGE (OUTPATIENT)
Dept: NEUROLOGY | Facility: CLINIC | Age: 35
End: 2025-07-24
Payer: COMMERCIAL

## 2025-08-06 ENCOUNTER — TELEPHONE (OUTPATIENT)
Dept: PSYCHIATRY | Facility: CLINIC | Age: 35
End: 2025-08-06
Payer: COMMERCIAL

## 2025-08-13 ENCOUNTER — OFFICE VISIT (OUTPATIENT)
Dept: FAMILY MEDICINE CLINIC | Facility: CLINIC | Age: 35
End: 2025-08-13
Payer: COMMERCIAL

## 2025-08-13 VITALS
HEIGHT: 68 IN | WEIGHT: 186.7 LBS | BODY MASS INDEX: 28.3 KG/M2 | TEMPERATURE: 97.7 F | OXYGEN SATURATION: 100 % | HEART RATE: 83 BPM | DIASTOLIC BLOOD PRESSURE: 88 MMHG | SYSTOLIC BLOOD PRESSURE: 120 MMHG

## 2025-08-13 DIAGNOSIS — M54.2 NECK PAIN: ICD-10-CM

## 2025-08-13 DIAGNOSIS — N64.4 NIPPLE PAIN: Primary | ICD-10-CM

## 2025-08-13 LAB
B-HCG UR QL: NEGATIVE
EXPIRATION DATE: NORMAL
EXPIRATION DATE: NORMAL
INTERNAL CONTROL: NORMAL
INTERNAL NEGATIVE CONTROL: NORMAL
INTERNAL POSITIVE CONTROL: NORMAL
Lab: 433
Lab: NORMAL
S PYO AG THROAT QL: NEGATIVE

## 2025-08-13 PROCEDURE — 1126F AMNT PAIN NOTED NONE PRSNT: CPT | Performed by: NURSE PRACTITIONER

## 2025-08-13 PROCEDURE — 3074F SYST BP LT 130 MM HG: CPT | Performed by: NURSE PRACTITIONER

## 2025-08-13 PROCEDURE — 81025 URINE PREGNANCY TEST: CPT | Performed by: NURSE PRACTITIONER

## 2025-08-13 PROCEDURE — 99214 OFFICE O/P EST MOD 30 MIN: CPT | Performed by: NURSE PRACTITIONER

## 2025-08-13 PROCEDURE — 3079F DIAST BP 80-89 MM HG: CPT | Performed by: NURSE PRACTITIONER

## 2025-08-13 PROCEDURE — 87880 STREP A ASSAY W/OPTIC: CPT | Performed by: NURSE PRACTITIONER

## 2025-08-13 RX ORDER — PREDNISONE 5 MG/1
TABLET ORAL
Qty: 1 EACH | Refills: 0 | Status: SHIPPED | OUTPATIENT
Start: 2025-08-13

## 2025-08-13 RX ORDER — ORPHENADRINE CITRATE 100 MG/1
100 TABLET ORAL 2 TIMES DAILY
Qty: 20 TABLET | Refills: 0 | Status: SHIPPED | OUTPATIENT
Start: 2025-08-13

## 2025-08-29 ENCOUNTER — PROCEDURE VISIT (OUTPATIENT)
Dept: NEUROLOGY | Facility: CLINIC | Age: 35
End: 2025-08-29
Payer: COMMERCIAL

## 2025-08-29 DIAGNOSIS — G43.719 INTRACTABLE CHRONIC MIGRAINE WITHOUT AURA AND WITHOUT STATUS MIGRAINOSUS: Primary | ICD-10-CM
